# Patient Record
Sex: MALE | Race: WHITE | Employment: OTHER | URBAN - METROPOLITAN AREA
[De-identification: names, ages, dates, MRNs, and addresses within clinical notes are randomized per-mention and may not be internally consistent; named-entity substitution may affect disease eponyms.]

---

## 2017-01-30 PROBLEM — J02.9 SORE THROAT: Status: ACTIVE | Noted: 2017-01-30

## 2018-09-27 PROBLEM — J02.9 SORE THROAT: Status: RESOLVED | Noted: 2017-01-30 | Resolved: 2018-09-27

## 2019-03-01 ENCOUNTER — TELEPHONE (OUTPATIENT)
Dept: FAMILY MEDICINE CLINIC | Age: 39
End: 2019-03-01

## 2019-03-03 DIAGNOSIS — Z20.818 EXPOSURE TO STREP THROAT: Primary | ICD-10-CM

## 2019-03-03 RX ORDER — AMOXICILLIN 500 MG/1
500 CAPSULE ORAL 2 TIMES DAILY
Qty: 20 CAPSULE | Refills: 0 | Status: SHIPPED | OUTPATIENT
Start: 2019-03-03 | End: 2019-03-13

## 2020-01-09 ENCOUNTER — OFFICE VISIT (OUTPATIENT)
Dept: FAMILY MEDICINE CLINIC | Age: 40
End: 2020-01-09
Payer: MEDICARE

## 2020-01-09 VITALS
HEART RATE: 50 BPM | TEMPERATURE: 97.5 F | WEIGHT: 261 LBS | SYSTOLIC BLOOD PRESSURE: 132 MMHG | BODY MASS INDEX: 35.39 KG/M2 | DIASTOLIC BLOOD PRESSURE: 88 MMHG

## 2020-01-09 LAB — S PYO AG THROAT QL: NORMAL

## 2020-01-09 PROCEDURE — G8417 CALC BMI ABV UP PARAM F/U: HCPCS | Performed by: PHYSICIAN ASSISTANT

## 2020-01-09 PROCEDURE — G8427 DOCREV CUR MEDS BY ELIG CLIN: HCPCS | Performed by: PHYSICIAN ASSISTANT

## 2020-01-09 PROCEDURE — 87880 STREP A ASSAY W/OPTIC: CPT | Performed by: PHYSICIAN ASSISTANT

## 2020-01-09 PROCEDURE — G8484 FLU IMMUNIZE NO ADMIN: HCPCS | Performed by: PHYSICIAN ASSISTANT

## 2020-01-09 PROCEDURE — 99213 OFFICE O/P EST LOW 20 MIN: CPT | Performed by: PHYSICIAN ASSISTANT

## 2020-01-09 PROCEDURE — 1036F TOBACCO NON-USER: CPT | Performed by: PHYSICIAN ASSISTANT

## 2020-01-09 RX ORDER — AMOXICILLIN 875 MG/1
875 TABLET, COATED ORAL 2 TIMES DAILY
Qty: 20 TABLET | Refills: 0 | Status: SHIPPED | OUTPATIENT
Start: 2020-01-09 | End: 2020-03-05 | Stop reason: ALTCHOICE

## 2020-01-09 ASSESSMENT — ENCOUNTER SYMPTOMS
VOMITING: 0
SORE THROAT: 1
SWOLLEN GLANDS: 1
CHANGE IN BOWEL HABIT: 0
SINUS PRESSURE: 0
EYES NEGATIVE: 1
NAUSEA: 1
VISUAL CHANGE: 0
COUGH: 0
ABDOMINAL PAIN: 0

## 2020-01-09 NOTE — PATIENT INSTRUCTIONS
chances of quitting for good. · Use a vaporizer or humidifier to add moisture to your bedroom. Follow the directions for cleaning the machine. When should you call for help? Call your doctor now or seek immediate medical care if:    · You have new or worse trouble swallowing.     · Your sore throat gets much worse on one side.    Watch closely for changes in your health, and be sure to contact your doctor if you do not get better as expected. Where can you learn more? Go to https://[email or web address] org and sign in to your [employer] account. Enter I575 in the [employer] box to learn more about \"Sore Throat: Care Instructions. \"     If you do not have an account, please click on the \"Sign Up Now\" link. Current as of: July 28, 2019  Content Version: 12.3  © 1531-1006 Healthwise, Incorporated. Care instructions adapted under license by [facility] [place] ([facility]). If you have questions about a medical condition or this instruction, always ask your healthcare professional. [place] [address] any warranty or liability for your use of this information.

## 2020-01-09 NOTE — PROGRESS NOTES
[address] [facility]  [address] [facility] [address]  Phone: [phone]  Fax: [phone]       [facility] Walk - In    Pt Name: Mary Black  MRN: [id number]  [place] 1980  Date of evaluation: 1/9/2020  Provider: Scarlet Pinto PA-C     CHIEF COMPLAINT       Chief Complaint   Patient presents with    Pharyngitis           HISTORY OF PRESENT ILLNESS  (Location/Symptom, Timing/Onset, Context/Setting, Quality, Duration, Modifying Factors, Severity.)   Mary Black is a 42 y.o. White [1] male who presents to the office for evaluation of      Daughter positive for strep    Pharyngitis   The current episode started today. The problem occurs constantly. Associated symptoms include myalgias, nausea, a sore throat and swollen glands. Pertinent negatives include no abdominal pain, anorexia, arthralgias, change in bowel habit, chest pain, chills, congestion, coughing, diaphoresis, fatigue, fever, headaches, joint swelling, neck pain, rash, urinary symptoms, vertigo, visual change, vomiting or weakness. The symptoms are aggravated by drinking, eating and swallowing. He has tried nothing for the symptoms. Nursing Notes were reviewed. REVIEW OF SYSTEMS    (2-9 systems for level 4, 10 or more for level 5)     Review of Systems   Constitutional: Negative for chills, diaphoresis, fatigue and fever. HENT: Positive for postnasal drip and sore throat. Negative for congestion, ear discharge, ear pain and sinus pressure. Ear congestion   Eyes: Negative. Respiratory: Negative for cough. Cardiovascular: Negative for chest pain. Gastrointestinal: Positive for nausea. Negative for abdominal pain, anorexia, change in bowel habit and vomiting. Genitourinary: Negative. Musculoskeletal: Positive for myalgias. Negative for arthralgias, joint swelling and neck pain. Skin: Negative for rash. Neurological: Negative for vertigo, weakness and headaches. Except as noted above the remainder of the review of systems was reviewed andnegative. PAST MEDICAL HISTORY   History reviewed. Past Medical History:   Diagnosis Date    Annular tear of lumbar disc 2016    Lumbar degenerative disc disease 2016    Protrusion of lumbar intervertebral disc 2016         SURGICAL HISTORY     History reviewed. Past Surgical History:   Procedure Laterality Date    VASECTOMY           CURRENT MEDICATIONS       Current Outpatient Medications   Medication Sig Dispense Refill    acetaminophen (TYLENOL) 500 MG tablet Take 500 mg by mouth every 6 hours as needed for Pain       No current facility-administered medications for this visit. ALLERGIES     Patient has no known allergies. FAMILY HISTORY     No family history on file. Family Status   Relation Name Status    Mother      Father  Alive   Pema Robles Brother ##Brother1 Alive          SOCIAL HISTORY      reports that he quit smoking about 12 years ago. His smoking use included cigarettes. He has quit using smokeless tobacco.  His smokeless tobacco use included chew. PHYSICAL EXAM    (up to 7 for level 4, 8 or more for level 5)     Vitals:    20 1533   BP: 132/88   Site: Right Upper Arm   Position: Sitting   Cuff Size: Large Adult   Pulse: 50   Temp: 97.5 °F (36.4 °C)   Weight: 261 lb (118.4 kg)         Physical Exam  Vitals signs and nursing note reviewed. Constitutional:       General: He is not in acute distress. Appearance: Normal appearance. He is not ill-appearing, toxic-appearing or diaphoretic. HENT:      Head: Normocephalic and atraumatic. Right Ear: Tympanic membrane, ear canal and external ear normal. There is no impacted cerumen. Left Ear: Tympanic membrane, ear canal and external ear normal. There is no impacted cerumen. Nose: Congestion and rhinorrhea present.       Mouth/Throat:      Mouth: Mucous membranes are moist.      Pharynx: No oropharyngeal exudate or posterior oropharyngeal erythema. Eyes:      General: No scleral icterus. Right eye: No discharge. Left eye: No discharge. Extraocular Movements: Extraocular movements intact. Conjunctiva/sclera: Conjunctivae normal.      Pupils: Pupils are equal, round, and reactive to light. Cardiovascular:      Rate and Rhythm: Normal rate and regular rhythm. Pulses: Normal pulses. Heart sounds: Normal heart sounds. Pulmonary:      Effort: Pulmonary effort is normal.      Breath sounds: Normal breath sounds. Abdominal:      Palpations: Abdomen is soft. Skin:     General: Skin is warm and dry. Neurological:      Mental Status: He is alert and oriented to person, place, and time. DIFFERENTIAL DIAGNOSIS:       Meng Hodgsno reviewed the disposition diagnosis with the patient and or their family/guardian. I have answered their questions and given discharge instructions. They voiced understanding of these instructions and did not have anyfurther questions or complaints. PROCEDURES:  Orders Placed This Encounter   Procedures    POCT rapid strep A       No results found for this visit on 01/09/20. FINALIMPRESSION      1. Sore throat            PLAN     Return if symptoms worsen or fail to improve. DISCHARGEMEDICATIONS:  No orders of the defined types were placed in this encounter. Plan:  Based on the clinical exam findings-- I will treat this as a bacterial pharyngitis despite the negative rapid strep. Patient instructed to complete entire antibiotic course. Tylenol/Motrin as needed for fever/discomfort. Salt water gargles and throat lozenges if desired. Change toothbrush in 24 hours. Patient agreeable to treatment plan. Educational materials provided on AVS.  Follow up if symptoms do not improve/worsen. Patient instructed to return to the office if symptoms worsen, return, or have any other concerns. Patient understands and is agreeable.          Trinity Herbert PA-C 1/9/2020 3:53 PM

## 2020-03-05 ENCOUNTER — OFFICE VISIT (OUTPATIENT)
Dept: FAMILY MEDICINE CLINIC | Age: 40
End: 2020-03-05
Payer: MEDICARE

## 2020-03-05 VITALS
WEIGHT: 265 LBS | HEART RATE: 64 BPM | TEMPERATURE: 98.1 F | BODY MASS INDEX: 35.89 KG/M2 | DIASTOLIC BLOOD PRESSURE: 82 MMHG | HEIGHT: 72 IN | SYSTOLIC BLOOD PRESSURE: 122 MMHG

## 2020-03-05 PROCEDURE — 90471 IMMUNIZATION ADMIN: CPT | Performed by: NURSE PRACTITIONER

## 2020-03-05 PROCEDURE — 99203 OFFICE O/P NEW LOW 30 MIN: CPT | Performed by: NURSE PRACTITIONER

## 2020-03-05 PROCEDURE — 90715 TDAP VACCINE 7 YRS/> IM: CPT | Performed by: NURSE PRACTITIONER

## 2020-03-05 ASSESSMENT — ENCOUNTER SYMPTOMS
WHEEZING: 0
SHORTNESS OF BREATH: 0
BLOOD IN STOOL: 0
BACK PAIN: 1
DIARRHEA: 0
ABDOMINAL PAIN: 0
COUGH: 0
VOMITING: 0
CONSTIPATION: 0
SORE THROAT: 0
NAUSEA: 0
SINUS PRESSURE: 0
TROUBLE SWALLOWING: 0
RHINORRHEA: 0
CHEST TIGHTNESS: 0

## 2020-03-05 ASSESSMENT — PATIENT HEALTH QUESTIONNAIRE - PHQ9
SUM OF ALL RESPONSES TO PHQ QUESTIONS 1-9: 0
1. LITTLE INTEREST OR PLEASURE IN DOING THINGS: 0
SUM OF ALL RESPONSES TO PHQ QUESTIONS 1-9: 0
SUM OF ALL RESPONSES TO PHQ9 QUESTIONS 1 & 2: 0
2. FEELING DOWN, DEPRESSED OR HOPELESS: 0

## 2020-03-05 NOTE — PROGRESS NOTES
[address] [facility]   [address]   [phone]    3/5/2020    Florin Bridges is a 42 y.o. male who presents today for his  medical conditions/complaints as noted below. Florin Bridges is c/o of [facility]  . HPI:     Patient presents today for physical to continuing to be a  in his home. This physical and evaluation needs to be completed every 2 years per state. He has a new patient within the office. He denies any ailments, chest pain, shortness of breath, or recent viral infections. Patient is a  of the [employer]. Continues to go to the [place] for yearly blood work, and follow through. Will request records of lab work and immunizations from the [place]. She has no significant medical history, no surgery, does not smoke. Vitals:    20 0856   BP: 122/82   Site: Left Upper Arm   Position: Sitting   Cuff Size: Large Adult   Pulse: 64   Temp: 98.1 °F (36.7 °C)   TempSrc: Tympanic   Weight: 265 lb (120.2 kg)   Height: 6' (1.829 m)      Past Medical History:   Diagnosis Date    Annular tear of lumbar disc 2016    Lumbar degenerative disc disease 2016    Protrusion of lumbar intervertebral disc 2016      Past Surgical History:   Procedure Laterality Date    VASECTOMY      WISDOM TOOTH EXTRACTION       Family History   Problem Relation Age of Onset    High Blood Pressure Father     Heart Attack Paternal Grandfather      Social History     Tobacco Use    Smoking status: Former Smoker     Types: Cigarettes     Last attempt to quit: 2007     Years since quittin.2    Smokeless tobacco: Former User     Types: Chew   Substance Use Topics    Alcohol use: Not on file      No current outpatient medications on file. No current facility-administered medications for this visit.       No Known Allergies    Health Maintenance   Topic Date Due    Varicella vaccine (1 of 2 - 2-dose childhood series) 1983    HIV screen no impacted cerumen. Tympanic membrane is not erythematous. Nose: Nose normal. No congestion or rhinorrhea. Mouth/Throat:      Lips: Pink. Mouth: Mucous membranes are moist.      Pharynx: Oropharynx is clear. No oropharyngeal exudate or posterior oropharyngeal erythema. Eyes:      General: Lids are normal.      Extraocular Movements: Extraocular movements intact. Right eye: No nystagmus. Left eye: No nystagmus. Conjunctiva/sclera: Conjunctivae normal.      Pupils: Pupils are equal, round, and reactive to light. Neck:      Musculoskeletal: Full passive range of motion without pain and normal range of motion. No neck rigidity or muscular tenderness. Vascular: No carotid bruit. Cardiovascular:      Rate and Rhythm: Normal rate and regular rhythm. Pulses: Normal pulses. Carotid pulses are 2+ on the right side and 2+ on the left side. Radial pulses are 2+ on the right side and 2+ on the left side. Dorsalis pedis pulses are 2+ on the right side and 2+ on the left side. Heart sounds: Normal heart sounds, S1 normal and S2 normal. No murmur. Pulmonary:      Effort: Pulmonary effort is normal. No respiratory distress. Breath sounds: Normal breath sounds and air entry. No wheezing or rales. Abdominal:      General: Bowel sounds are normal.      Palpations: Abdomen is soft. Tenderness: There is no abdominal tenderness. Musculoskeletal: Normal range of motion. General: No swelling or tenderness. Right lower leg: No edema. Left lower leg: No edema. Lymphadenopathy:      Cervical: No cervical adenopathy. Skin:     General: Skin is warm and dry. Capillary Refill: Capillary refill takes less than 2 seconds. Findings: No bruising, erythema or rash. Neurological:      General: No focal deficit present. Mental Status: He is alert and oriented to person, place, and time.    Psychiatric:         Attention and

## 2020-07-17 ENCOUNTER — OFFICE VISIT (OUTPATIENT)
Dept: PRIMARY CARE CLINIC | Age: 40
End: 2020-07-17
Payer: MEDICARE

## 2020-07-17 VITALS
HEIGHT: 72 IN | RESPIRATION RATE: 16 BRPM | SYSTOLIC BLOOD PRESSURE: 132 MMHG | TEMPERATURE: 97.2 F | WEIGHT: 269 LBS | HEART RATE: 64 BPM | DIASTOLIC BLOOD PRESSURE: 86 MMHG | OXYGEN SATURATION: 97 % | BODY MASS INDEX: 36.44 KG/M2

## 2020-07-17 PROCEDURE — 99213 OFFICE O/P EST LOW 20 MIN: CPT | Performed by: PHYSICIAN ASSISTANT

## 2020-07-17 RX ORDER — KETOROLAC TROMETHAMINE 30 MG/ML
60 INJECTION, SOLUTION INTRAMUSCULAR; INTRAVENOUS ONCE
Status: COMPLETED | OUTPATIENT
Start: 2020-07-17 | End: 2020-07-17

## 2020-07-17 RX ORDER — IBUPROFEN 200 MG
200 TABLET ORAL EVERY 6 HOURS PRN
COMMUNITY

## 2020-07-17 RX ORDER — ACETAMINOPHEN 500 MG
500 TABLET ORAL EVERY 6 HOURS PRN
COMMUNITY

## 2020-07-17 RX ORDER — KETOROLAC TROMETHAMINE 30 MG/ML
30 INJECTION, SOLUTION INTRAMUSCULAR; INTRAVENOUS ONCE
Qty: 1 ML | Refills: 0
Start: 2020-07-17 | End: 2020-07-17 | Stop reason: CLARIF

## 2020-07-17 RX ORDER — METHYLPREDNISOLONE 4 MG/1
TABLET ORAL
Qty: 1 KIT | Refills: 0 | Status: SHIPPED | OUTPATIENT
Start: 2020-07-17 | End: 2020-07-23

## 2020-07-17 RX ADMIN — KETOROLAC TROMETHAMINE 60 MG: 30 INJECTION, SOLUTION INTRAMUSCULAR; INTRAVENOUS at 11:44

## 2020-07-17 ASSESSMENT — ENCOUNTER SYMPTOMS
RHINORRHEA: 0
BOWEL INCONTINENCE: 0
ABDOMINAL PAIN: 0
BACK PAIN: 1
SHORTNESS OF BREATH: 0
VOMITING: 0
EYE PAIN: 0
CONSTIPATION: 0
DIARRHEA: 0
COUGH: 0
SINUS PAIN: 0
NAUSEA: 0

## 2020-07-17 NOTE — PROGRESS NOTES
[facility]  [address] [facility] [address]  [address] [address]  Dept: [phone]  Dept Fax: [phone]    Job Sommers is a 42 y.o. male who presents today for his medical conditions/complaints as noted below. Chief Complaint   Patient presents with    Back Pain     x 1 month lt side sciatica, sees Chiropractor advised to get injection to help with pain       HPI:     Patient is here today for evaluation of back pain. Back Pain   This is a chronic (over several years) problem. The current episode started more than 1 month ago. The problem occurs constantly. The problem has been gradually worsening since onset. The pain is present in the lumbar spine. The quality of the pain is described as shooting. The pain radiates to the left thigh, left knee and left foot. The pain is the same all the time. The symptoms are aggravated by sitting, standing and bending. Associated symptoms include numbness (left leg) and tingling. Pertinent negatives include no abdominal pain, bladder incontinence, bowel incontinence, chest pain, fever or headaches. (He does admit to noticing \"left foot dragging\" on rare occasion.) Risk factors include obesity. He has tried chiropractic manipulation, NSAIDs, muscle relaxant, analgesics and bed rest for the symptoms. The treatment provided mild relief.        No results found for: LABA1C          ( goal A1C is < 7)   No results found for: LABMICR  No results found for: LDLCHOLESTEROL, LDLCALC    (goal LDL is <100)   No results found for: AST, ALT, BUN  BP Readings from Last 3 Encounters:   20 132/86   20 122/82   20 132/88          (goal 120/80)    Past Medical History:   Diagnosis Date    Annular tear of lumbar disc 2016    Lumbar degenerative disc disease 2016    Protrusion of lumbar intervertebral disc 2016      Past Surgical History:   Procedure Laterality Date    VASECTOMY      WISDOM TOOTH EXTRACTION         Family History   Problem Relation Age of Onset    High Blood Pressure Father     Heart Attack Paternal Grandfather        Social History     Tobacco Use    Smoking status: Former Smoker     Types: Cigarettes     Last attempt to quit: 2007     Years since quittin.6    Smokeless tobacco: Former User     Types: Chew   Substance Use Topics    Alcohol use: Not on file      Current Outpatient Medications   Medication Sig Dispense Refill    ibuprofen (ADVIL;MOTRIN) 200 MG tablet Take 200 mg by mouth every 6 hours as needed for Pain      acetaminophen (TYLENOL) 500 MG tablet Take 500 mg by mouth every 6 hours as needed for Pain      Menthol, Topical Analgesic, (BIOFREEZE EX) Apply topically      methylPREDNISolone (MEDROL DOSEPACK) 4 MG tablet Take by mouth. 1 kit 0     Current Facility-Administered Medications   Medication Dose Route Frequency Provider Last Rate Last Dose    ketorolac (TORADOL) injection 60 mg  60 mg Intramuscular Once Es Lynn PA-C         No Known Allergies    Health Maintenance   Topic Date Due    Varicella vaccine (1 of 2 - 2-dose childhood series) 1983    HIV screen  1997    Flu vaccine (1) 2020    DTaP/Tdap/Td vaccine (2 - Td) 2030    Hepatitis A vaccine  Aged Out    Hepatitis B vaccine  Aged Out    Hib vaccine  Aged Out    Meningococcal (ACWY) vaccine  Aged Out    Pneumococcal 0-64 years Vaccine  Aged Out       Subjective:      Review of Systems   Constitutional: Negative for chills, fatigue and fever. HENT: Negative for congestion, rhinorrhea and sinus pain. Eyes: Negative for pain. Respiratory: Negative for cough and shortness of breath. Cardiovascular: Negative for chest pain and leg swelling. Gastrointestinal: Negative for abdominal pain, bowel incontinence, constipation, diarrhea, nausea and vomiting. Genitourinary: Negative for bladder incontinence, difficulty urinating, enuresis and testicular pain. Musculoskeletal: Positive for back pain. Negative for arthralgias and myalgias. Skin: Negative for rash. Neurological: Positive for tingling and numbness (left leg). Negative for dizziness and headaches. Psychiatric/Behavioral: Negative for confusion and sleep disturbance. The patient is not nervous/anxious. Objective:     Physical Exam  Constitutional:       General: He is not in acute distress. Appearance: Normal appearance. HENT:      Head: Normocephalic. Mouth/Throat:      Mouth: Mucous membranes are moist.   Eyes:      Extraocular Movements: Extraocular movements intact. Conjunctiva/sclera: Conjunctivae normal.      Pupils: Pupils are equal, round, and reactive to light. Neck:      Musculoskeletal: Normal range of motion. Cardiovascular:      Rate and Rhythm: Normal rate and regular rhythm. Pulses: Normal pulses. Heart sounds: Normal heart sounds. Pulmonary:      Effort: Pulmonary effort is normal.      Breath sounds: Normal breath sounds. Musculoskeletal:         General: Tenderness (lumbar spine) present. Right lower leg: No edema. Left lower leg: No edema. Comments: Lumbar spine: Tenderness to low lumbar spine. Positive straight leg raise of the left. Sensation is equal and intact bilaterally. Lymphadenopathy:      Cervical: No cervical adenopathy. Skin:     General: Skin is warm. Capillary Refill: Capillary refill takes less than 2 seconds. Neurological:      General: No focal deficit present. Mental Status: He is alert and oriented to person, place, and time. Motor: No weakness. Gait: Gait normal.   Psychiatric:         Mood and Affect: Mood normal.         Behavior: Behavior normal.       /86   Pulse 64   Temp 97.2 °F (36.2 °C)   Resp 16   Ht 6' (1.829 m)   Wt 269 lb (122 kg)   SpO2 97%   BMI 36.48 kg/m²     Assessment:       ICD-10-CM    1.  Left lumbar radiculopathy  M54.16 methylPREDNISolone (MEDROL

## 2020-07-20 ENCOUNTER — HOSPITAL ENCOUNTER (OUTPATIENT)
Dept: PHYSICAL THERAPY | Facility: CLINIC | Age: 40
Setting detail: THERAPIES SERIES
Discharge: HOME | End: 2020-07-20
Payer: MEDICARE

## 2020-07-20 PROCEDURE — 97161 PT EVAL LOW COMPLEX 20 MIN: CPT

## 2020-07-20 PROCEDURE — 97110 THERAPEUTIC EXERCISES: CPT

## 2020-07-20 PROCEDURE — 97140 MANUAL THERAPY 1/> REGIONS: CPT

## 2020-07-24 ENCOUNTER — HOSPITAL ENCOUNTER (OUTPATIENT)
Dept: PHYSICAL THERAPY | Facility: CLINIC | Age: 40
Setting detail: THERAPIES SERIES
Discharge: HOME | End: 2020-07-24
Payer: MEDICARE

## 2020-07-24 PROCEDURE — 97110 THERAPEUTIC EXERCISES: CPT

## 2020-07-24 NOTE — FLOWSHEET NOTE
[] [facility]) - [facility]  [facility]  [address]  [phone]  F: [phone] [] [address]  [place] [address]   [address]  P: [phone]  F: [phone] [] [address] &  Therapy  [address]  P: [phone]  F: [phone] [] [address]  [facility]   [address]   [place]: [phone]  F: [phone]      Physical Therapy Daily Treatment Note    Date:  2020  Patient Name:  Vlad Salter    :  1980  MRN: [id number]  Physician: Romain 14: [employer] 30 per yr  Medical Diagnosis:   Left lumbar radiculopathy       Rehab Codes: M54.16  Onset Date: 20 script                Next 's appt.: prn    Visit# / total visits:      Cancels/No Shows:     Subjective:  Pt reports he has pain in his calf and piriformis region today. He states he could not perform supine HS S as his HEP because it increased his tingling sensation down his leg. Pain:  [] Yes  [] No Location: L piriformis/calf  Pain Rating: (0-10 scale) 3/10  Pain altered Tx:  [] No  [] Yes  Action:  Comments:    Objective:   Todays Treatment:  Modalities:   Precautions:  Exercises:  Exercise Reps/ Time Weight/ Level Comments    Bike 10'   x          Kneeling hip flex S 3x30\"   x   SB S 3x30\"   x   Step S 3x30\"   Next    Supine inversion gastroc rope stretch  3x30\"   x   Supine HS 3x30\"      Piriformis S 3x30\"    x   LTR 10x10\"  L side only  x          Bridges  2x10   x   Clamshells  2x10   x   SL hip ABD 2x10     Next    Prone prop on elbows 3x30\"    x   Prone hip extension  2x10   x          4-way hip 2x10 [place]                                                    Other:      Manual: Prone PA mobs- Not performed today                 Hypervolt L gastroc                DI Electronically signed by:  Rosa Guzman, PTA

## 2020-07-27 ENCOUNTER — HOSPITAL ENCOUNTER (OUTPATIENT)
Dept: PHYSICAL THERAPY | Facility: CLINIC | Age: 40
Setting detail: THERAPIES SERIES
Discharge: HOME | End: 2020-07-27
Payer: MEDICARE

## 2020-07-27 PROCEDURE — 97110 THERAPEUTIC EXERCISES: CPT

## 2020-07-27 NOTE — FLOWSHEET NOTE
[] [facility]) - [facility] &  Therapy  [address]  [phone]  F: [phone] [] [address]  [facility]   [address]  P: [phone]  F: [phone] [] [address] &  Therapy  [address]  P: [phone]  F: [phone] [] [address]  [facility]   [address]   [place]: [phone]  F: [phone]      Physical Therapy Daily Treatment Note    Date:  2020  Patient Name:  Martinez Brito    :  1980  MRN: [id number]  Physician: Romain 14: [employer] 30 per yr  Medical Diagnosis:   Left lumbar radiculopathy       Rehab Codes: M54.16  Onset Date: 20 script                Next 's appt.: prn    Visit# / total visits: 3/12     Cancels/No Shows:     Subjective:     Pain:  [x] Yes  [] No Location: L piriformis/calf  Pain Rating: (0-10 scale) 3/10  Pain altered Tx:  [] No  [] Yes  Action:  Comments: Pt reports continued pain down his LLE. Was sore after last tx, cont to have soreness in L piriformis after last visit. Objective:   Todays Treatment:  Modalities:   Precautions:  Exercises:  Exercise Reps/ Time Weight/ Level Comments    Bike 10'   x          Kneeling hip flex S 3x30\"   x   SB S 3x30\"   x   Step S 3x30\"   Next    Supine inversion gastroc rope stretch  3x30\"   x   Supine HS 3x30\"  Performed passively by PTA x   Piriformis S 3x30\"    x   LTR 10x10\"  L side only  x                 PPT X10, 5\"   x   PPT w/ marches, kicks x20 ea   x   Bridges  2x10   x   Clamshells  2x10 blue  x   SL hip ABD 2x10     x   Prone prop on elbows 3x30\"    x   Prone hip extension  2x10  2 pillows under hips  x          4-way hip x15 B blue  x   Paloff Press   next                                    Other:      Manual: Prone PA mobs- Not performed today                 Hypervolt L gastroc                DI bilateral piriformis R>L     Specific Instructions for next treatment: Manual, strength program,  Ambar method extension exercises        Treatment Charges: Mins Units   []  Modalities     [x]  Ther Exercise 45 3   [x]  Manual Therapy 5 nc   []  Ther Activities     []  Aquatics     []  Vasocompression     []  Other     Total Treatment time 50 3       Assessment: [x] Progressing toward goals. Initiated session on bike, followed by manual, then stretches. Pt unable to independently stretch L hamstring without eliciting pain, therefore PTA performed passive stretch with good carry over. Progressed program with 3 way hip this date with emphasis on TA activation. Also added PPT and SL abduction to mat exercises. Pt reports muscle fatigue post session. [] No change. [] Other:  [] Patient would continue to benefit from skilled physical therapy services in order to: address the following deficits    STG: (to be met in  treatments)  1. ? Pain: 2/10 at worst  2. ? ROM: Lumbar flexion WFL, improve HA/calf extensibility  3. ? Strength: Pt to demo good postural/core stability  4. ? Function: Able to transition from sit to stand w/o difficulty and deepika stationary position for >1HR  5. Patient to be independent with home exercise program as demonstrated by performance with correct form without cues.       Pt. Education:  [x]? Plans/Goals, Risks/Benefits discussed  []? Home exercise program  Method of Education: [x]? Verbal  [x]? Demo  []? Written  Comprehension of Education:  [x]? Verbalizes understanding. []? Demonstrates understanding. []? Needs Review. []? Demonstrates/verbalizes understanding of HEP/Ed previously given. Plan: [x] Continue current frequency toward long and short term goals.     [] Specific Instructions for subsequent treatments:       Time In: 9:55 am              Time Out: 11:00 am     Electronically signed by:  Cuco Doyle PTA

## 2020-07-31 ENCOUNTER — HOSPITAL ENCOUNTER (OUTPATIENT)
Dept: PHYSICAL THERAPY | Facility: CLINIC | Age: 40
Setting detail: THERAPIES SERIES
Discharge: HOME | End: 2020-07-31
Payer: MEDICARE

## 2020-07-31 PROCEDURE — G0283 ELEC STIM OTHER THAN WOUND: HCPCS

## 2020-07-31 PROCEDURE — 97110 THERAPEUTIC EXERCISES: CPT

## 2020-07-31 NOTE — FLOWSHEET NOTE
subsequent treatments:       Time In: 7:58 am              Time Out: 9:00 am     Electronically signed by:  Home Jordan PTA

## 2020-08-04 ENCOUNTER — HOSPITAL ENCOUNTER (OUTPATIENT)
Dept: PHYSICAL THERAPY | Facility: CLINIC | Age: 40
Setting detail: THERAPIES SERIES
Discharge: HOME | End: 2020-08-04
Payer: MEDICARE

## 2020-08-04 PROCEDURE — 97110 THERAPEUTIC EXERCISES: CPT

## 2020-08-04 PROCEDURE — 97012 MECHANICAL TRACTION THERAPY: CPT

## 2020-08-04 NOTE — FLOWSHEET NOTE
[] Formerly [facility]) - [facility] &  Therapy  [address]  [phone]  F: [phone] [] [address]  [facility]   [address]  P: [phone]  F: [phone] [] [address] &  Therapy  [address] [facility]  P: [phone]  F: [phone] [] [address]  [facility]   [address]   Elisha Wright: [phone]  F: [phone]      Physical Therapy Daily Treatment Note    Date:  2020  Patient Name:  Kayden Krishna    :  1980  MRN: [id number]  Physician: Romain 14: [employer] 30 per yr  Medical Diagnosis:   Left lumbar radiculopathy       Rehab Codes: M54.16  Onset Date: 20 script                Next 's appt.: prn    Visit# / total visits:      Cancels/No Shows:     Subjective:     Pain:  [x] Yes  [] No Location: L piriformis/calf  Pain Rating: (0-10 scale) 3/10  Pain altered Tx:  [] No  [] Yes  Action:  Comments: Cont to report same pain level, no real changes in sx since beginning PT. Objective:   Todays Treatment:  Modalities: IFC to L LB - 15' with HP, pt in prone - not today  Mechanical Lumbar Traction with pt in supine - 30sec on 10sec off for 15min, 100lb pull  Precautions:  Exercises:  Exercise Reps/ Time Weight/ Level Comments    Bike 10'   -          Kneeling hip flex S 3x30\"   -   SB S 3x30\"   x   Step S 3x30\"   -    Supine inversion gastroc rope stretch  3x30\"   -   Supine HS 3x30\"  Performed passively x   Piriformis S 3x30\"    x   LTR 10x10\"  L side only  x   Supine Hip Flexor S 2'   x          PPT X10, 5\"   -   PPT w/ marches, kicks x20 ea   -   Bridges  2x10   -   Clamshells  2x10 blue  -   SL hip ABD 2x10     -   Prone prop on elbows 3x45\"    x   Prone hip extension  2x10  2 pillows under hips  x   Prone Glut Squeezes X20, 5\"   x Instructions for subsequent treatments:       Time In: 9:10 am              Time Out: 10:00 am     Electronically signed by:  Rj Sol PTA

## 2020-08-06 ENCOUNTER — HOSPITAL ENCOUNTER (OUTPATIENT)
Dept: PHYSICAL THERAPY | Facility: CLINIC | Age: 40
Setting detail: THERAPIES SERIES
Discharge: HOME | End: 2020-08-06
Payer: MEDICARE

## 2020-08-06 PROCEDURE — 97110 THERAPEUTIC EXERCISES: CPT

## 2020-08-06 PROCEDURE — 97012 MECHANICAL TRACTION THERAPY: CPT

## 2020-08-06 NOTE — FLOWSHEET NOTE
[] [address]  - [facility]  [facility]  [address]  [phone]  F: [phone] [] [address]  [facility]   [address]  P: [phone]  F: [phone] [] [address] &  Therapy  [address] [facility]  P: [phone]  F: [phone] [] [address]  [facility]   [address]   [place]: [phone]  F: [phone]      Physical Therapy Daily Treatment Note    Date:  2020  Patient Name:  Lenny Joseph    :  1980  MRN: [id number]  Physician: Romain 14: [employer] 30 per yr  Medical Diagnosis:   Left lumbar radiculopathy       Rehab Codes: M54.16  Onset Date: 20 script                Next 's appt.: prn    Visit# / total visits:      Cancels/No Shows:     Subjective:     Pain:  [x] Yes  [] No Location: L piriformis/calf  Pain Rating: (0-10 scale) 3/10  Pain altered Tx:  [] No  [] Yes  Action:  Comments: States he made a doctors appointment for , states it was the earliest they could get him in. Reports that he was sore following last session. Objective:   Todays Treatment:  Modalities: IFC to L LB - 15' with HP, pt in prone - not today  Mechanical Lumbar Traction with pt in supine - 30sec on 10sec off for 15min, 100lb pull  Precautions:  Exercises:  Exercise Reps/ Time Weight/ Level Comments    Bike 10'   -          Kneeling hip flex S 3x30\"   -   SB S 3x30\"   x   Step S 3x30\"   -    Supine inversion gastroc rope stretch  3x30\"   -   Supine HS 3x30\"  Performed passively x   Piriformis S 3x30\"    x   LTR 10x10\"  L side only  x   Supine Hip Flexor S 2'   x          PPT X10, 5\"   -   PPT w/ marches, kicks x20 ea   -   Bridges  2x10   -   Clamshells  2x10 blue  -   SL hip ABD 2x10     -   Prone prop on elbows 3x45\"    x   Prone hip extension  2x10  2 pillows under hips  x   Prone Glut Squeezes X20, 5\"   x          Quadruped Alt UE x20   x   4-way hip x15 B blue  -   Paloff Press 2x10 red  -                                   Other:      Manual: Prone PA mobs- Not performed today                 Hypervolt L gastroc - NT                DI L piriformis     Specific Instructions for next treatment: Manual, strength program,  Ambar method extension exercises        Treatment Charges: Mins Units   [x]  Modalities - mech traction 15 1   [x]  Ther Exercise 30 2   []  Manual Therapy     []  Ther Activities     []  Aquatics     []  Vasocompression     []  Other     Total Treatment time 45 3       Assessment: [x] Progressing toward goals. Cont to initiate session with mechanical lumbar traction with parameters above. DI to L piriformis. Added quadruped exercise for core stability with good tolerance. Focused on prone ex mostly today for sx centralization. Pt experienced shooting pain down L leg when stepping onto slantboard, subsided and was able to complete all stretches. Pt reports soreness post session. Scheduled with primary therapist at next visit. [] No change. [] Other:  [] Patient would continue to benefit from skilled physical therapy services in order to: address the following deficits    STG: (to be met in  treatments)  1. ? Pain: 2/10 at worst  2. ? ROM: Lumbar flexion WFL, improve HA/calf extensibility  3. ? Strength: Pt to demo good postural/core stability  4. ? Function: Able to transition from sit to stand w/o difficulty and deepika stationary position for >1HR  5. Patient to be independent with home exercise program as demonstrated by performance with correct form without cues.       Pt. Education:  [x]? Plans/Goals, Risks/Benefits discussed  []? Home exercise program  Method of Education: [x]? Verbal  [x]? Demo  []? Written  Comprehension of Education:  [x]? Verbalizes understanding. []? Demonstrates understanding. []?  Needs

## 2020-08-11 ENCOUNTER — HOSPITAL ENCOUNTER (OUTPATIENT)
Dept: PHYSICAL THERAPY | Facility: CLINIC | Age: 40
Setting detail: THERAPIES SERIES
Discharge: HOME | End: 2020-08-11
Payer: MEDICARE

## 2020-08-11 PROCEDURE — 97012 MECHANICAL TRACTION THERAPY: CPT

## 2020-08-11 PROCEDURE — 97140 MANUAL THERAPY 1/> REGIONS: CPT

## 2020-08-11 PROCEDURE — 97110 THERAPEUTIC EXERCISES: CPT

## 2020-08-11 NOTE — FLOWSHEET NOTE
x   LTR 10x10\"  L side only     Supine Hip Flexor S 2'   x          PPT X10, 5\"   x   PPT w/ marches, kicks x20 ea   x   Bridges  2x10   -   Clamshells  2x10 blue  -   SL hip ABD 2x10     -   Prone prop on elbows 3x45\"    x   Prone hip extension  2x10  2 pillows under hips     Prone Glut Squeezes X20, 5\"   x          Quadruped Alt UE x20   x   4-way hip x15 B blue  -   Paloff Press 2x10 red  -                                   Other:      Manual: Prone PA mobs                Hypervolt L gastroc - NT                DI L piriformis     Specific Instructions for next treatment: Manual, strength program,  Ambar method extension exercises        Treatment Charges: Mins Units   [x]  Modalities - mech traction 15 1   [x]  Ther Exercise 30 2   [x]  Manual Therapy 10 1   []  Ther Activities     []  Aquatics     []  Vasocompression     []  Other     Total Treatment time 55 4       Assessment: [x] Progressing toward goals. Initiated treatment w/ ex this visit w/ focus on stretches and core stabilization ex. Mod cues for proper contraction and breathing. Slight R ant rot and pubic dysfunction noted, corrected w/ MET and shotgun maneuver w/ good alignment achieved. Pt w/ good centralization w/ prone ext ex and standing ext, encouraged to complete throughout the day ketty w/ incr symptoms. Ended treatment w/ mechanical lumbar traction, incr wt and hold time, deepika well noting min soreness but no incr in pain into LE upon standing. Will cont as deepika. [] No change. [] Other:  [x] Patient would continue to benefit from skilled physical therapy services in order to: address the following deficits    STG: (to be met in 12  treatments)  1. ? Pain: 2/10 at worst  2. ? ROM: Lumbar flexion WFL, improve HA/calf extensibility-progressing towards  3. ? Strength: Pt to demo good postural/core stability-progressing  4. ? Function: Able to transition from sit to stand w/o difficulty and deepika stationary position for >1HR  5.  Patient to be independent with home exercise program as demonstrated by performance with correct form without cues. -progressing towards        Pt. Education:  [x]? Plans/Goals, Risks/Benefits discussed  [x]? Home exercise program  Method of Education: [x]? Verbal  [x]? Demo  []? Written  Comprehension of Education:  [x]? Verbalizes understanding. []? Demonstrates understanding. []? Needs Review. []? Demonstrates/verbalizes understanding of HEP/Ed previously given. Plan: [x] Continue current frequency toward long and short term goals.     [] Specific Instructions for subsequent treatments:       Time In: 7:58 am              Time Out: 9:00 am     Electronically signed by:  Ander Andres, PT

## 2020-08-13 ENCOUNTER — HOSPITAL ENCOUNTER (OUTPATIENT)
Dept: PHYSICAL THERAPY | Facility: CLINIC | Age: 40
Setting detail: THERAPIES SERIES
Discharge: HOME | End: 2020-08-13
Payer: MEDICARE

## 2020-08-13 PROCEDURE — 97110 THERAPEUTIC EXERCISES: CPT

## 2020-08-13 PROCEDURE — 97012 MECHANICAL TRACTION THERAPY: CPT

## 2020-08-13 NOTE — FLOWSHEET NOTE
-   Clamshells  2x10 blue  -   SL hip ABD 2x10     -   Prone prop on elbows 3x45\"    x   Prone hip extension  2x10  2 pillows under hips     Prone Glut Squeezes X20, 5\"   x          Quadruped Alt UE x20   x   Quadruped Alt LE x10   x   4-way hip x15 B blue  -   Paloff Press 2x10 red  -                                   Other:      Manual: Prone PA mobs                Hypervolt L gastroc - NT                DI L piriformis     Specific Instructions for next treatment: Manual, strength program,  Ambar method extension exercises        Treatment Charges: Mins Units   [x]  Modalities - [facility] traction 15 1   [x]  Ther Exercise 30 2   [x]  Manual Therapy     []  Ther Activities     []  Aquatics     []  Vasocompression     []  Other     Total Treatment time 45 3       Assessment: [x] Progressing toward goals. Continued with Traction with no increase in pain noted. Followed up traction with stretches and exercises with decreased muscle tightness noted. Pt needing some VC's for  trunk rotation with quadruped exercises as well as proper contraction with PPT. Pt scheduled to see doctor next week about further testing, will be back next Friday and then out of town following week. [] No change. [] Other:  [x] Patient would continue to benefit from skilled physical therapy services in order to: address the following deficits    STG: (to be met in 12  treatments)  1. ? Pain: 2/10 at worst  2. ? ROM: Lumbar flexion WFL, improve HA/calf extensibility-progressing towards  3. ? Strength: Pt to demo good postural/core stability-progressing  4. ? Function: Able to transition from sit to stand w/o difficulty and deepika stationary position for >1HR  5. Patient to be independent with home exercise program as demonstrated by performance with correct form without cues. -progressing towards        Pt. Education:  [x]? Plans/Goals, Risks/Benefits discussed  [x]? Home exercise program  Method of Education: [x]? Verbal  [x]? Demo  []? Written  Comprehension of Education:  [x]? Verbalizes understanding. []? Demonstrates understanding. []? Needs Review. []? Demonstrates/verbalizes understanding of HEP/Ed previously given. Plan: [x] Continue current frequency toward long and short term goals.     [] Specific Instructions for subsequent treatments:       Time In: 9:30 am              Time Out: 10:30 am    Electronically signed by:  Selam Allen PTA

## 2020-08-18 ENCOUNTER — OFFICE VISIT (OUTPATIENT)
Dept: FAMILY MEDICINE CLINIC | Age: 40
End: 2020-08-18
Payer: MEDICARE

## 2020-08-18 VITALS
TEMPERATURE: 96.3 F | SYSTOLIC BLOOD PRESSURE: 110 MMHG | HEART RATE: 53 BPM | OXYGEN SATURATION: 97 % | DIASTOLIC BLOOD PRESSURE: 82 MMHG | WEIGHT: 259 LBS | BODY MASS INDEX: 35.13 KG/M2 | RESPIRATION RATE: 16 BRPM

## 2020-08-18 PROCEDURE — 1036F TOBACCO NON-USER: CPT | Performed by: NURSE PRACTITIONER

## 2020-08-18 PROCEDURE — G8427 DOCREV CUR MEDS BY ELIG CLIN: HCPCS | Performed by: NURSE PRACTITIONER

## 2020-08-18 PROCEDURE — G8417 CALC BMI ABV UP PARAM F/U: HCPCS | Performed by: NURSE PRACTITIONER

## 2020-08-18 PROCEDURE — 99213 OFFICE O/P EST LOW 20 MIN: CPT | Performed by: NURSE PRACTITIONER

## 2020-08-18 ASSESSMENT — ENCOUNTER SYMPTOMS
COLOR CHANGE: 0
ABDOMINAL PAIN: 0
BOWEL INCONTINENCE: 0
DIARRHEA: 0
NAUSEA: 0
BLOOD IN STOOL: 0
VOMITING: 0
BACK PAIN: 1
CONSTIPATION: 0

## 2020-08-18 NOTE — PROGRESS NOTES
[address] [facility]   [id number]  [facility]  [phone]    8/18/2020    Visit Information    Have you changed or started any medications since your last visit including any over-the-counter medicines, vitamins, or herbal medicines? No   Are you having any side effects from any of your medications? -  no  Have you stopped taking any of your medications? Is so, why? -  no    Have you seen any other physician or provider since your last visit? Yes - Records Obtained Urgent care, PT- 2x's weekly, Chiropractor 2x's weekly- Stretching/Strengthening and Traction. Have you had any other diagnostic tests since your last visit? No  Have you been seen in the emergency room and/or had an admission to a hospital since we last saw you? No  Have you had your routine dental cleaning in the past 6 months? yes - Yesterday    Have you activated your [employer] account? If not, what are your barriers? No:       Patient Care Team:  TALIA Carr CNP as PCP - General (Nurse Practitioner Family)  TALIA Carr CNP as PCP - [facility]      Vlad Salter is a 42 y.o. male who presents today for his  medical conditions/complaints as noted below. Vlad Salter is c/o of Back Pain (Going to PT 8 times so far. Raditing to leg and ankle/calf. Flare up. ) and Flank Pain (Left side ongoing pain. Pain described as burning, shooting, pulling. Chiropracter. Steroid pack and steroid shot. )  . HPI:     Is a pleasant 26-year-old  male. He presents the office today for ongoing chronic lumbar back pain. Patient had a significant injury December 2006 while he was in the [employer]. Patient is aware of previous imaging stating that L3-L4, L4-L5, L5-S1 have significant stenosis and some protrusion/bulging of his discs. Patient has not had any imaging in approximately 4 years time. Unfortunately, due to his imaging being completed within the [place], we do not have access to those reports. We will request moving forward. Patient recently went July 17 to a walk-in clinic. Patient was having acute lumbar back pain that was radiating to his left leg at that time. Patient was given a Toradol IM injection as well as steroid Dosepak. Today his pain is tolerable and he is rating a 3. He is still able to perform all of his activities of daily living, however he does limit his activity and he is very aware of what he is doing. Patient is denying the need for any additional steroids or muscle relaxers at this time. Patient states that when he takes Tylenol or ibuprofen, it masks his pain, therefore he is concerned he is going to cause more damage. Patient is only taking over-the-counter ibuprofen or acetaminophen maybe once per week. Patient also comments that in the past he has had epidural injections. Does follow with chiropractic on a monthly basis. Due to the worsening of his symptoms over the past month he is going more often, multiple times during the week. Patient is also going to physical therapy 3 times a week. He has been going for the past 4 weeks ever since it was ordered per the physician assistant in the walk-in [facility] clinic. Patient states it is helping his pain in his symptoms significantly, however the main stem of the problem is still not fixed. As patient is not currently having any lumbar pain or sciatic there is entire extremity, at times with positioning, he is having numbness and tingling distal his calf to his foot. Patient denies any pain using the restroom having bowel movements or urinating. He does have some sleep disturbance due to the pain. Back Pain   This is a recurrent problem. The current episode started more than 1 month ago. The problem occurs constantly. The problem has been waxing and waning since onset. The pain is present in the lumbar spine. The pain radiates to the left foot. The pain is at a severity of 3/10. The pain is mild.  The pain is worse during the night. Stiffness is present at night. Associated symptoms include leg pain (left calf), numbness (left calf), tingling (left calf) and weakness (LLE). Pertinent negatives include no abdominal pain, bladder incontinence, bowel incontinence, pelvic pain or perianal numbness. He has tried chiropractic manipulation, ice, NSAIDs and home exercises for the symptoms. The treatment provided mild relief. Vitals:    20 0905   BP: 110/82   Site: Left Upper Arm   Position: Sitting   Cuff Size: Large Adult   Pulse: 53   Resp: 16   Temp: 96.3 °F (35.7 °C)   TempSrc: Tympanic   SpO2: 97%   Weight: 259 lb (117.5 kg)      Past Medical History:   Diagnosis Date    Annular tear of lumbar disc 2016    Lumbar degenerative disc disease 2016    Protrusion of lumbar intervertebral disc 2016      Past Surgical History:   Procedure Laterality Date    VASECTOMY      WISDOM TOOTH EXTRACTION       Family History   Problem Relation Age of Onset    High Blood Pressure Father     Heart Attack Paternal Grandfather      Social History     Tobacco Use    Smoking status: Former Smoker     Types: Cigarettes     Last attempt to quit: 2007     Years since quittin.6    Smokeless tobacco: Former User     Types: Chew   Substance Use Topics    Alcohol use: Not on file      Current Outpatient Medications   Medication Sig Dispense Refill    ibuprofen (ADVIL;MOTRIN) 200 MG tablet Take 200 mg by mouth every 6 hours as needed for Pain      acetaminophen (TYLENOL) 500 MG tablet Take 500 mg by mouth every 6 hours as needed for Pain      Menthol, Topical Analgesic, (BIOFREEZE EX) Apply topically       No current facility-administered medications for this visit.       No Known Allergies    Health Maintenance   Topic Date Due    Varicella vaccine (1 of 2 - 2-dose childhood series) 1983    HIV screen  1997    Flu vaccine (1) 2020    DTaP/Tdap/Td vaccine (2 - Td) 2030    Hepatitis A vaccine  Aged Out    Hepatitis B vaccine  Aged Out    Hib vaccine  Aged Out    Meningococcal (ACWY) vaccine  Aged Out    Pneumococcal 0-64 years Vaccine  Aged Out       Subjective:      Review of Systems   Constitutional: Positive for activity change. Negative for appetite change and fatigue. Gastrointestinal: Negative for abdominal pain, blood in stool, bowel incontinence, constipation, diarrhea, nausea and vomiting. Genitourinary: Negative for bladder incontinence, difficulty urinating, flank pain, frequency and pelvic pain. Musculoskeletal: Positive for arthralgias, back pain (lumbar) and myalgias (left calf). Negative for gait problem. Skin: Negative for color change. Neurological: Positive for tingling (left calf), weakness (LLE) and numbness (left calf). Psychiatric/Behavioral: Positive for sleep disturbance. Objective:     Physical Exam  Vitals signs and nursing note reviewed. Constitutional:       General: He is not in acute distress. Appearance: Normal appearance. He is well-developed and well-groomed. He is obese. He is not ill-appearing or toxic-appearing. Cardiovascular:      Rate and Rhythm: Normal rate and regular rhythm. No extrasystoles are present. Pulses: Normal pulses. Dorsalis pedis pulses are 2+ on the right side and 2+ on the left side. Posterior tibial pulses are 2+ on the right side and 2+ on the left side. Heart sounds: Normal heart sounds and S1 normal. No murmur. Pulmonary:      Effort: Pulmonary effort is normal. No prolonged expiration or respiratory distress. Breath sounds: Normal breath sounds and air entry. Musculoskeletal: Normal range of motion. Lumbar back: He exhibits tenderness. He exhibits normal range of motion, no swelling, no edema, no laceration, no pain and no spasm. Right lower leg: No edema. Left lower leg: He exhibits no tenderness, no bony tenderness and no swelling. No edema. Legs:       Comments: Full active and passive range of motion   Skin:     General: Skin is warm and dry. Coloration: Skin is not pale. Neurological:      Mental Status: He is alert and oriented to person, place, and time. Motor: Motor function is intact. No tremor. Gait: Gait is intact. Psychiatric:         Attention and Perception: Attention and perception normal.         Mood and Affect: Mood and affect normal.         Speech: Speech normal.         Behavior: Behavior is cooperative. Thought Content: Thought content normal.         Cognition and Memory: Cognition and memory normal.         Judgment: Judgment normal.          Assessment:      1. Protrusion of lumbar intervertebral disc  -Worsening   - MRI LUMBAR SPINE WO CONTRAST; Future    2. Annular tear of lumbar disc  - MRI LUMBAR SPINE WO CONTRAST; Future    3. Lumbar degenerative disc disease  -Worsening   - MRI LUMBAR SPINE WO CONTRAST; Future       Plan:      No follow-ups on file. Orders Placed This Encounter   Procedures    MRI LUMBAR SPINE WO CONTRAST     Standing Status:   Future     Standing Expiration Date:   10/18/2020     Order Specific Question:   Reason for exam:     Answer:   known stenosis L3-L4, L4-L5, L5-S1, worsening of sxs, numbness to left calf     No orders of the defined types were placed in this encounter. Reviewed health maintenance, prior labs and imaging. Patient given educational materials - see patient instructions. Discussed use, benefit, and side effects of prescribed medications. Barriers to medication compliance addressed. All patient questions answered. Pt voiced understanding to plan of care. Instructed to continue medications as discussed, healthy diet and exercise. Patient agreed with treatment plan. Follow up as directed below. Communication:      Items for Patient/Writer/Staff to Chace Ohara  Patient is denying need for any pharmacology at today's visit.   He denies muscle relaxer, steroids, or medication for pain control. We will wait for MRI of lumbar spine to determine if he needs to continue chiropractic care and physical therapy, or if neurosurgery referral is appropriate. Quality Measures  BMI Readings from Last 1 Encounters:   08/18/20 35.13 kg/m²        No results found for: LABA1C    BP Readings from Last 3 Encounters:   08/18/20 110/82   07/17/20 132/86   03/05/20 122/82        The ASCVD Risk score (Derrick Herrmann, et al., 2013) failed to calculate for the following reasons:     The 2013 ASCVD risk score is only valid for ages 36 to 78     PHQ Scores 3/5/2020 4/30/2018   PHQ2 Score 0 0   PHQ9 Score 0 0     Interpretation of Total Score Depression Severity: 1-4 = Minimal depression, 5-9 = Mild depression, 10-14 = Moderate depression, 15-19 = Moderately severe depression, 20-27 = Severe depression     Electronically signed by PAYTON Ryan on 8/18/2020 at 9:43 AM

## 2020-08-18 NOTE — PATIENT INSTRUCTIONS
Patient Education        Back Care and Preventing Injuries: Care Instructions  Your Care Instructions     You can hurt your back doing many everyday activities: lifting a heavy box, bending down to garden, exercising at the gym, and even getting out of bed. But you can keep your back strong and healthy by doing some exercises. You also can follow a few tips for sitting, sleeping, and lifting to avoid hurting your back again. Talk to your doctor before you start an exercise program. Ask for help if you want to learn more about keeping your back healthy. Follow-up care is a key part of your treatment and safety. Be sure to make and go to all appointments, and call your doctor if you are having problems. It's also a good idea to know your test results and keep a list of the medicines you take. How can you care for yourself at home? · Stay at a healthy weight to avoid strain on your lower back. · Do not smoke. Smoking increases the risk of osteoporosis, which weakens the spine. If you need help quitting, talk to your doctor about stop-smoking programs and medicines. These can increase your chances of quitting for good. · Make sure you sleep in a position that maintains your back's normal curves and on a mattress that feels comfortable. Sleep on your side with a pillow between your knees, or sleep on your back with a pillow under your knees. These positions can reduce strain on your back. · When you get out of bed, lie on your side and bend both knees. Drop your feet over the edge of the bed as you push up with both arms. Scoot to the edge of the bed. Make sure your feet are in line with your rear end (buttocks), and then stand up. · If you must stand for a long time, put one foot on a stool, ledge, or box. Exercise to strengthen your back and other muscles  · Get at least 30 minutes of exercise on most days of the week. Walking is a good choice.  You also may want to do other activities, such as running, swimming, cycling, or playing tennis or team sports. · Stretch your back muscles. Here are few exercises to try:  ? Lie on your back with your knees bent and your feet flat on the floor. Gently pull one bent knee to your chest. Put that foot back on the floor, and then pull the other knee to your chest. Hold for 15 to 30 seconds. Repeat 2 to 4 times. ? Do pelvic tilts. Lie on your back with your knees bent. Tighten your stomach muscles. Pull your belly button (navel) in and up toward your ribs. You should feel like your back is pressing to the floor and your hips and pelvis are slightly lifting off the floor. Hold for 6 seconds while breathing smoothly. · Keep your core muscles strong. The muscles of your back, belly (abdomen), and buttocks support your spine. ? Pull in your belly, and imagine pulling your navel toward your spine. Hold this for 6 seconds, then relax. Remember to keep breathing normally as you tense your muscles. ? Do curl-ups. Always do them with your knees bent. Keep your low back on the floor, and curl your shoulders toward your knees using a smooth, slow motion. Keep your arms folded across your chest. If this bothers your neck, try putting your hands behind your neck (not your head), with your elbows spread apart. ? Lie on your back with your knees bent and your feet flat on the floor. Tighten your belly muscles, and then push with your feet and raise your buttocks up a few inches. Hold this position 6 seconds as you continue to breathe normally, then lower yourself slowly to the floor. Repeat 8 to 12 times. ? If you like group exercise, try Pilates or yoga. These classes have poses that strengthen the core muscles. Protect your back when you sit  · Place a small pillow, a rolled-up towel, or a lumbar roll in the curve of your back if you need extra support. · Sit in a chair that is low enough to let you place both feet flat on the floor with both knees nearly level with your hips.  If your chair or desk is too high, use a foot rest to raise your knees. · When driving, keep your knees nearly level with your hips. Sit straight, and drive with both hands on the steering wheel. Your arms should be in a slightly bent position. · Try a kneeling chair, which helps tilt your hips forward. This takes pressure off your lower back. · Try sitting on an exercise ball. It can rock from side to side, which helps keep your back loose. Lift properly  · Squat down, bending at the hips and knees only. If you need to, put one knee to the floor and extend your other knee in front of you, bent at a right angle (half kneeling). · Press your chest straight forward. This helps keep your upper back straight while keeping a slight arch in your low back. · Hold the load as close to your body as possible, at the level of your navel. · Use your feet to change direction, taking small steps. · Lead with your hips as you change direction. Keep your shoulders in line with your hips as you move. Do not twist your body. · Set down your load carefully, squatting with your knees and hips only. When should you call for help? Watch closely for changes in your health, and be sure to contact your doctor if you have any problems. Where can you learn more? Go to https://[employer].EventCombo. org and sign in to your [employer] account. Enter S810 in the [facility] box to learn more about \"Back Care and Preventing Injuries: Care Instructions. \"     If you do not have an account, please click on the \"Sign Up Now\" link. Current as of: March 2, 2020               Content Version: 12.5  © 6157-0299 Healthwise, Incorporated. Care instructions adapted under license by [facility] ([facility]). If you have questions about a medical condition or this instruction, always ask your healthcare professional. [address] any warranty or liability for your use of this information.

## 2020-08-21 ENCOUNTER — HOSPITAL ENCOUNTER (OUTPATIENT)
Dept: PHYSICAL THERAPY | Facility: CLINIC | Age: 40
Setting detail: THERAPIES SERIES
Discharge: HOME | End: 2020-08-21
Payer: MEDICARE

## 2020-08-21 PROCEDURE — 97110 THERAPEUTIC EXERCISES: CPT

## 2020-08-21 PROCEDURE — 97012 MECHANICAL TRACTION THERAPY: CPT

## 2020-08-21 NOTE — FLOWSHEET NOTE
[] [facility]) - [facility] &  Therapy  [address]  [phone]  F: [phone] [] [address]  [facility]   [address]  P: [phone]  F: [phone] [] [address] &  Therapy  [address] [facility]  P: [phone]  F: [phone] [] [address]  [facility]   [address]   [place]: [phone]  F: [phone]      Physical Therapy Daily Treatment Note    Date:  2020  Patient Name:  Stanislav Sheriff    :  1980  MRN: [id number]  Physician: Romain 14: [employer] 30 per yr  Medical Diagnosis:   Left lumbar radiculopathy       Rehab Codes: M54.16  Onset Date: 20 script                Next 's appt.:     Visit# / total visits:      Cancels/No Shows:     Subjective:     Pain:  [x] Yes  [] No Location: L piriformis/calf  Pain Rating: (0-10 scale) 3/10  Pain altered Tx:  [x] No  [] Yes  Action:  Comments: Pt states pain level is a little higher than normal today, in unsure why. States he is scheduled to have an MRI on .     Objective:   Todays Treatment:  Modalities: IFC to L LB - 15' with HP, pt in prone - not today  Mechanical Lumbar Traction with pt in supine - 40sec on 10sec off for 15min, 110lb pull  Precautions:  Exercises:  Exercise Reps/ Time Weight/ Level Comments    Bike 10'   -          Kneeling hip flex S 3x30\"   -   SB S 3x30\"      Step S 3x30\"   -    Supine inversion gastroc rope stretch  3x30\"   -   Supine HS 3x30\"  With strap  x   Piriformis S 3x30\"    x   LTR 10x10\"  L side only     Supine Hip Flexor S 2'   x          PPT X10, 5\"   x   PPT w/ marches, kicks x20 ea   x   PPT deadbug x20   x   PPT heel walks x20   x   Bridges  2x10   -   Clamshells  2x10 blue  -   SL hip ABD 2x10     -   Prone prop on elbows 3x1'    x   Prone hip extension  2x10  2 pillows under hips     Prone Glut Squeezes X20, 5\"   x          Quadruped Alt UE x20   x   Quadruped Alt LE x10   x   4-way hip x15 B blue  -   Paloff Press 2x10 red  -                                   Other:      Manual: Prone PA mobs                Hypervolt L gastroc - NT                DI L piriformis     Specific Instructions for next treatment: Manual, strength program,  Ambar method extension exercises        Treatment Charges: Mins Units   [x]  Modalities - [facility] traction 15 1   [x]  Ther Exercise 30 2   [x]  Manual Therapy     []  Ther Activities     []  Aquatics     []  Vasocompression     []  Other     Total Treatment time 45 3       Assessment: [x] Progressing toward goals. Continued to initiate session with traction, followed by stretches. Progressed DLS exercises with addition of heel walk outs and deadbugs. Increased time spent with prone prop today to promote relief of sx. Continues to require cues to avoid rotation during quadruped leg extension. Pt reports no change in pain post session. Is going out of town next week, then has MRI the following week on Monday, August 31.      [] No change. [] Other:  [x] Patient would continue to benefit from skilled physical therapy services in order to: address the following deficits    STG: (to be met in 12  treatments)  1. ? Pain: 2/10 at worst  2. ? ROM: Lumbar flexion WFL, improve HA/calf extensibility-progressing towards  3. ? Strength: Pt to demo good postural/core stability-progressing  4. ? Function: Able to transition from sit to stand w/o difficulty and deepika stationary position for >1HR  5. Patient to be independent with home exercise program as demonstrated by performance with correct form without cues. -progressing towards        Pt. Education:  [x]? Plans/Goals, Risks/Benefits discussed  [x]? Home exercise program  Method of Education: [x]? Verbal  [x]? Demo  []? Written  Comprehension of Education:  [x]? Verbalizes understanding. []? Demonstrates understanding. []? Needs Review. []? Demonstrates/verbalizes understanding of HEP/Ed previously given. Plan: [x] Continue current frequency toward long and short term goals.     [] Specific Instructions for subsequent treatments:       Time In: 8:01 am              Time Out: 8:57 am    Electronically signed by:  Faustina Blackburn PTA

## 2020-08-31 ENCOUNTER — HOSPITAL ENCOUNTER (OUTPATIENT)
Dept: MRI IMAGING | Facility: CLINIC | Age: 40
Discharge: HOME | End: 2020-09-02
Payer: MEDICARE

## 2020-08-31 PROCEDURE — 72148 MRI LUMBAR SPINE W/O DYE: CPT

## 2020-09-02 ENCOUNTER — HOSPITAL ENCOUNTER (OUTPATIENT)
Dept: PHYSICAL THERAPY | Facility: CLINIC | Age: 40
Setting detail: THERAPIES SERIES
Discharge: HOME | End: 2020-09-02
Payer: MEDICARE

## 2020-09-02 PROCEDURE — 97110 THERAPEUTIC EXERCISES: CPT

## 2020-09-02 PROCEDURE — 97012 MECHANICAL TRACTION THERAPY: CPT

## 2020-09-02 NOTE — FLOWSHEET NOTE
[] [facility]) [facility]  [facility]  [address]  [phone]  F: [phone] [] [address]  [facility]   [address]  P: [phone]  F: [phone] [] [address] &  Therapy  [address]  P: [phone]  F: [phone] [] [address]  [facility]   [address]   [place]: [phone]  F: [phone]      Physical Therapy Daily Treatment Note    Date:  2020  Patient Name:  Gordo Dooley     :  1980  MRN: [id number]  Physician: [address]: [facility] per yr  Medical Diagnosis:   Left lumbar radiculopathy       Rehab Codes: M54.16  Onset Date: 20 script                  Next 's appt.:     Visit# / total visits: 10/12     Cancels/No Shows:     Subjective:     Pain:  [x] Yes  [] No Location: L piriformis/calf  Pain Rating: (0-10 scale) 3/10  Pain altered Tx:  [x] No  [] Yes  Action:  Comments: Pt with no significant change in pain symptoms since his previous visit. Did get his MRI and results as noted in Epic. Has an appointment with his primary care physician and plan to schedule further visits if needed after that follow up. Objective:   Todays Treatment:  Modalities: IFC to L LB - 15' with HP, pt in prone - not today  Mechanical Lumbar Traction with pt in supine - 40sec on 10sec off for 15min, 110lb pull  Precautions:  Exercises:  Exercise Reps/ Time Weight/ Level Comments    Bike 10'   -          Kneeling hip flex S 3x30\"   -   SB S 3x30\"      Step S 3x30\"   -    Supine inversion gastroc rope stretch  3x30\"   -   Supine HS 3x30\"  With strap  x   Piriformis S 3x30\"    x   LTR 10x10\"  L side only     Supine Hip Flexor S 2'   x                        PPT X10, 5\"      PPT w/ marches, kicks x20 ea      PPT deadbug x20 x   PPT heel walks x20   x   Bridges  2x10   -   Clamshells  2x10 blue  -   SL hip ABD 2x10     -   Prone prop on elbows 3x2'   with intermitten press up x   Prone hip extension  2x10  2 pillows under hips  x   Prone UE lift x10 ea   x   Prone swimmers 5x ea   x   Prone Glut Squeezes X20, 5\"   x          Quadruped Alt UE x20   x   Quadruped Alt LE x10   x   4-way hip x15 B blue  -   Paloff Press 2x10 red  -                                   Other:        Specific Instructions for next treatment: Manual, strength program,  Ambar method extension exercises        Treatment Charges: Mins Units   [x]  Modalities - [facility] traction 15 1   [x]  Ther Exercise 30 2   [x]  Manual Therapy     []  Ther Activities     []  Aquatics     []  Vasocompression     []  Other     Total Treatment time 45 3       Assessment: [x] Progressing toward goals. Continued with manual traction to begin treatment with fair pt response. Followed up traction with supine stretches, pt expressing difficulty completing HS stretch d/t \"catch\" felt in LB. Additional prone ex completed to address centralizing pain, pt denies increased symptoms with reporting minor relief with prone position. Increased HS pain with transition from prone to standing. Attempted to complete stool HS stretch with unable to obtain straight knee. [] No change. [] Other:  [x] Patient would continue to benefit from skilled physical therapy services in order to: address the following deficits    STG: (to be met in 12  treatments)  1. ? Pain: 2/10 at worst  2. ? ROM: Lumbar flexion WFL, improve HA/calf extensibility-progressing towards  3. ? Strength: Pt to demo good postural/core stability-progressing  4. ? Function: Able to transition from sit to stand w/o difficulty and deepika stationary position for >1HR  5. Patient to be independent with home exercise program as demonstrated by performance with correct form without cues. -progressing towards        Pt. Education:  [x]? Plans/Goals, Risks/Benefits discussed  [x]? Home exercise program  Method of Education: [x]? Verbal  [x]? Demo  []? Written  Comprehension of Education:  [x]? Verbalizes understanding. []? Demonstrates understanding. []? Needs Review. []? Demonstrates/verbalizes understanding of HEP/Ed previously given. Plan: [x] Continue current frequency toward long and short term goals.     [] Specific Instructions for subsequent treatments:       Time In: 8:05 am              Time Out: 8:57 am    Electronically signed by:  Moiz Morgan PTA

## 2020-09-04 ENCOUNTER — OFFICE VISIT (OUTPATIENT)
Dept: FAMILY MEDICINE CLINIC | Age: 40
End: 2020-09-04
Payer: MEDICARE

## 2020-09-04 VITALS
BODY MASS INDEX: 34.5 KG/M2 | HEART RATE: 61 BPM | SYSTOLIC BLOOD PRESSURE: 126 MMHG | RESPIRATION RATE: 20 BRPM | WEIGHT: 254.4 LBS | DIASTOLIC BLOOD PRESSURE: 84 MMHG | TEMPERATURE: 96.3 F | OXYGEN SATURATION: 97 %

## 2020-09-04 PROCEDURE — 99213 OFFICE O/P EST LOW 20 MIN: CPT | Performed by: NURSE PRACTITIONER

## 2020-09-04 PROCEDURE — G8417 CALC BMI ABV UP PARAM F/U: HCPCS | Performed by: NURSE PRACTITIONER

## 2020-09-04 PROCEDURE — 1036F TOBACCO NON-USER: CPT | Performed by: NURSE PRACTITIONER

## 2020-09-04 PROCEDURE — G8427 DOCREV CUR MEDS BY ELIG CLIN: HCPCS | Performed by: NURSE PRACTITIONER

## 2020-09-04 NOTE — PATIENT INSTRUCTIONS
Patient Education        Herniated Disc: Exercises  Introduction  Here are some examples of exercises for you to try. The exercises may be suggested for a condition or for rehabilitation. Start each exercise slowly. Ease off the exercises if you start to have pain. You will be told when to start these exercises and which ones will work best for you. How to stay safe  These exercises can help you move easier and feel better. But when you first start doing them, you may have more pain in your back. This is normal. But it is important to pay close attention to your pain during and after each exercise. · Keep doing these exercises if your pain stays the same or moves from your leg and buttock more toward the middle of your spine. Pain moving out of your leg and buttock is a good sign. · Stop doing these exercises if your pain gets worse in your leg and buttock. Stop if you start to have pain in your leg and buttock that you didn't have before. Be sure to do these exercises in the order they appear. Note how your pain changes before you move to the next one. If your pain is much worse right after exercise and stays worse the next day, do not do any of these exercises. How to do the exercises  1. Rest on belly   1. Lie on your stomach, with your head turned to the side. 2. Try to relax your lower back muscles as much as you can. 3. Continue to lie on your stomach for 2 minutes. · Keep your arms beside your body. · If that position bothers your neck, place your hands, one on top of the other, underneath your forehead. This will help support your head and neck. If lying in this position causes or increases pain down your leg, stop this exercise and do not do the next exercises. 2. Press-up back extension   1. Lie on your stomach, with your face down and your elbows tucked into your sides and under your shoulders. 2. Press your elbows down into the floor to raise your upper back.   3. Hold this position for 15 to 30 seconds. 4. Repeat 2 to 4 times. · As you do this, relax your stomach muscles and allow your back to arch without using your back muscles. · Let your low back relax completely as you arch up. Don't let your hips or pelvis come off the floor. Then relax, and return to the start position. Over time, work up to staying in the press-up position for up to 2 minutes. If lying in this position causes or increases pain down your leg, stop this exercise and do not do the next exercises. 3. Full press-up back extension   1. Lie on your stomach with your face down, keeping your elbows tucked into your sides and under your shoulders. 2. Straighten your elbows, and push your upper body up as far as you can. 3. Hold this position for 5 seconds, and then relax. 4. Repeat 10 times. Allow your lower back to sag. Keep your hips, pelvis, and legs relaxed. Each time, try to raise your upper body a little higher and hold your arms a bit straighter. If lying in this position causes or increases pain down your leg, stop this exercise and do not do the next exercises. If you can't do this exercise, you may instead try the backward bend exercise that follows. 4. Backward bend   1. Stand with your feet hip-width apart, and don't lock your knees. 2. Place your hands in the small of your back. 3. Bend backward as far as you can, keeping your knees straight. 4. Repeat 2 to 4 times. Your toes should be pointing forward. Hold this position for 2 to 3 seconds. Then return to your starting position. Each time, try to bend backward a little farther, until you bend backward as far as you can. If standing in this position causes or increases pain down your leg, stop this exercise. Follow-up care is a key part of your treatment and safety. Be sure to make and go to all appointments, and call your doctor if you are having problems.  It's also a good idea to know your test results and keep a list of the medicines you take.  Where can you learn more? Go to https://chpepiceweb.healthQian Xiaoâ€™er. org and sign in to your [employer] account. Enter A013 in the [employer] box to learn more about \"Herniated Disc: Exercises. \"     If you do not have an account, please click on the \"Sign Up Now\" link. Current as of: March 2, 2020               Content Version: 12.5  © 2006-2020 Healthwise, Incorporated. Care instructions adapted under license by [facility] ([facility]). If you have questions about a medical condition or this instruction, always ask your healthcare professional. [address] any warranty or liability for your use of this information.

## 2020-09-04 NOTE — PROGRESS NOTES
[facility]   [address]  [phone]    9/7/2020    Visit Information    Have you changed or started any medications since your last visit including any over-the-counter medicines, vitamins, or herbal medicines? no   Are you having any side effects from any of your medications? -  no  Have you stopped taking any of your medications? Is so, why? -  no    Have you seen any other physician or provider since your last visit? Yes - Records Obtained PT  Have you had any other diagnostic tests since your last visit? Yes - Records Obtained MRI Lumbar Spine  Have you been seen in the emergency room and/or had an admission to a hospital since we last saw you? No  Have you had your routine dental cleaning in the past 6 months? yes -      Have you activated your "[employer] account? If not, what are your barriers? Yes     Patient Care Team:  TALIA Barrios CNP as PCP - General (Nurse Practitioner Family)  TALIA Barrios CNP as PCP - [facility] Provider      Leah Gould is a 47 y.o. male who presents today for his  medical conditions/complaints as noted below. Leah Gould is c/o of Discuss Labs (MRI Results) and Back Pain  . HPI:     Patient is a pleasant 61-year-old  male. He presents the office to follow-up regarding his MRI results of his lumbar spine. These results were resulted and called the patient. Unfortunately patient was never reached. Due to his ongoing concern he did make a follow-up appointment to discuss it. Patient's imaging results were reviewed with him in detail. Patient is open for neurosurgical consult to decipher if surgical intervention is appropriate at this time. He is still experiencing pain on a daily basis. Patient is refusing any medication for pain or muscle relaxers.   Patient's rationale is he is afraid that if he does not feel the pain he is going to make in a rational decision and potentially harm his back Thought Content: Thought content normal.         Cognition and Memory: Cognition and memory normal.         Judgment: Judgment normal.          Assessment:      1. Protrusion of lumbar intervertebral disc  - Angelica Geronimo CNP, Neurosurgery, [place]    2. Lumbar degenerative disc disease  - Angelica Geronimo CNP, Neurosurgery, [place]       Plan:      Return for Follow up as needed, Call if systems do not improve or get worse. Orders Placed This Encounter   Procedures   [address], [address], CNP, Neurosurgery, [place]     Referral Priority:   Urgent     Referral Type:   Eval and Treat     Referral Reason:   Specialty Services Required     Referred to Provider:   TALIA Krueger CNP     Requested Specialty:   [facility] Nurse Practitioner     Number of Visits Requested:   1     No orders of the defined types were placed in this encounter. Reviewed health maintenance, prior labs and imaging. Patient given educational materials - see patient instructions. Discussed use, benefit, and side effects of prescribed medications. Barriers to medication compliance addressed. All patient questions answered. Pt voiced understanding to plan of care. Instructed to continue medications as discussed, healthy diet and exercise. Patient agreed with treatment plan. Follow up as directed below. Communication:      Items for Patient/Writer/Staff to Accomplish  We will send patient for neurosurgical evaluation for potential surgical intervention. Quality Measures  BMI Readings from Last 1 Encounters:   09/04/20 34.50 kg/m²        No results found for: LABA1C    BP Readings from Last 3 Encounters:   09/04/20 126/84   08/18/20 110/82   07/17/20 132/86        The ASCVD Risk score (Jori aWlker, et al., 2013) failed to calculate for the following reasons:     The 2013 ASCVD risk score is only valid for ages 36 to 78     PHQ Scores 3/5/2020 4/30/2018   PHQ2 Score 0 0   PHQ9 Score 0 0 Interpretation of Total Score Depression Severity: 1-4 = Minimal depression, 5-9 = Mild depression, 10-14 = Moderate depression, 15-19 = Moderately severe depression, 20-27 = Severe depression     Electronically signed by PAYTON Santos on 9/7/2020 at 4:24 PM

## 2020-09-07 ASSESSMENT — ENCOUNTER SYMPTOMS
BACK PAIN: 1
WHEEZING: 0
SHORTNESS OF BREATH: 0
COUGH: 0
CHEST TIGHTNESS: 0

## 2020-09-24 ENCOUNTER — OFFICE VISIT (OUTPATIENT)
Dept: NEUROSURGERY | Age: 40
End: 2020-09-24
Payer: MEDICARE

## 2020-09-24 ENCOUNTER — HOSPITAL ENCOUNTER (OUTPATIENT)
Dept: GENERAL RADIOLOGY | Age: 40
Discharge: HOME | End: 2020-09-26
Payer: MEDICARE

## 2020-09-24 ENCOUNTER — HOSPITAL ENCOUNTER (OUTPATIENT)
Age: 40
Discharge: HOME | End: 2020-09-26
Payer: MEDICARE

## 2020-09-24 VITALS
BODY MASS INDEX: 33.86 KG/M2 | HEIGHT: 72 IN | WEIGHT: 250 LBS | SYSTOLIC BLOOD PRESSURE: 126 MMHG | HEART RATE: 54 BPM | DIASTOLIC BLOOD PRESSURE: 76 MMHG | TEMPERATURE: 97.9 F

## 2020-09-24 PROCEDURE — G8427 DOCREV CUR MEDS BY ELIG CLIN: HCPCS | Performed by: NURSE PRACTITIONER

## 2020-09-24 PROCEDURE — 1036F TOBACCO NON-USER: CPT | Performed by: NURSE PRACTITIONER

## 2020-09-24 PROCEDURE — G8417 CALC BMI ABV UP PARAM F/U: HCPCS | Performed by: NURSE PRACTITIONER

## 2020-09-24 PROCEDURE — 72120 X-RAY BEND ONLY L-S SPINE: CPT

## 2020-09-24 PROCEDURE — 99203 OFFICE O/P NEW LOW 30 MIN: CPT | Performed by: NURSE PRACTITIONER

## 2020-09-24 NOTE — PROGRESS NOTES
Jonathan Bai  [facility] [address] [facility] [address] [id number]  Dept: [phone]    Patient:  Jeromy Frost  YOB: 1980  Date: 20    The patient is a 47 y.o. male who presents today for consult of the following problems:     Chief Complaint   Patient presents with    New Patient     Protrusion of lumbar intervertebral disc Lumbar Degenerative Disease    Results     MRI Lumbar 20         HPI:     Jeromy Frost is a 47 y.o. male on whom neurosurgical consultation was requested by TALIA Shook CNP for management of back and leg pain. Pt has had low back pain for >10, varying in intensity. Has had numerous rounds of PT, chiropractor visits, injections over the years. Symptoms have waxed and waned over the years. Did follow with the [place] clinic, did have approximately 3 injections in the remote past, reports temporary improvement with lumbar epidurals. Had worsening of symptoms over the last 4 months. Has completed multimodal physical therapy since onset of exacerbation, as well as multiple chiropractor visits with no improvement. Oral/IM steroids provided some temporary improvement, but pain is now returning to baseline severity. Pain is improved with lying down, worsened with position changing. Groin pain: No  Saddle anesthesia: No  Hip Pain: Yes-left  Bowel/bladder incontinence: No  Constipation or Urinary retention: No    LIMITATIONS    Pain significantly limiting from a daily standpoint.    Assistive devices: none  Daily pharmacologic pain control include: none  Back versus le/50    MANAGEMENT     Prior Surgery: No  Prior to 1yr ago: multiple epidurals through the [facility]-most relief from epidural- ~3 weeks with some improvement  In the last year:    Physical Therapy: Yes   Chiropractic Interventions: Yes   Injections: No  Improvement: n/a    Types of injections/responses: n/a    History:     Past Medical History: Diagnosis Date    Annular tear of lumbar disc 2016    Lumbar degenerative disc disease 2016    Protrusion of lumbar intervertebral disc 2016     Past Surgical History:   Procedure Laterality Date    VASECTOMY      WISDOM TOOTH EXTRACTION       Family History   Problem Relation Age of Onset    High Blood Pressure Father     Heart Attack Paternal Grandfather      Current Outpatient Medications on File Prior to Visit   Medication Sig Dispense Refill    ibuprofen (ADVIL;MOTRIN) 200 MG tablet Take 200 mg by mouth every 6 hours as needed for Pain      acetaminophen (TYLENOL) 500 MG tablet Take 500 mg by mouth every 6 hours as needed for Pain      Menthol, Topical Analgesic, (BIOFREEZE EX) Apply topically       No current facility-administered medications on file prior to visit. Social History     Tobacco Use    Smoking status: Former Smoker     Packs/day: 0.25     Years: 3.00     Pack years: 0.75     Types: Cigarettes     Last attempt to quit: 2007     Years since quittin.7    Smokeless tobacco: Former User     Types: Chew   Substance Use Topics    Alcohol use: Not on file    Drug use: Not on file       No Known Allergies    Review of Systems  Constitutional: Negative for activity change and appetite change. HENT: Negative for ear pain and facial swelling. Eyes: Negative for discharge and itching. Respiratory: Negative for choking and chest tightness. Cardiovascular: Negative for chest pain and leg swelling. Gastrointestinal: Negative for nausea and abdominal pain. Endocrine: Negative for cold intolerance and heat intolerance. Genitourinary: Negative for frequency and flank pain. Musculoskeletal: Negative for myalgias and joint swelling. Skin: Negative for rash and wound. Allergic/Immunologic: Negative for environmental allergies and food allergies. Hematological: Negative for adenopathy. Does not bruise/bleed easily.    Psychiatric/Behavioral: Negative for self-injury. The patient is not nervous/anxious. Physical Exam:      /76 (Site: Left Upper Arm, Position: Sitting, Cuff Size: Medium Adult)   Pulse 54   Temp 97.9 °F (36.6 °C) (Temporal)   Ht 6' (1.829 m)   Wt 250 lb (113.4 kg)   BMI 33.91 kg/m²   Estimated body mass index is 33.91 kg/m² as calculated from the following:    Height as of this encounter: 6' (1.829 m). Weight as of this encounter: 250 lb (113.4 kg). General:  Vlad Salter is a 47y.o. year old male who appears his stated age. HEENT: Normocephalic atraumatic. Neck supple. Chest: regular rate; pulses equal  Abdomen: Soft nontender nondistended. Ext: DP and PT pulses 2+, good cap refill  Neuro    Mentation  Appropriate affect  Registration intact  Orientation intact    Cranial Nerves:   Pupils equal and reactive to light  Extraocular motion intact  Face and shrug symmetric  Tongue midline  No dysarthria  v1-3 sensation symmetric, masseter tone symmetric  Hearing symmetric and intact    Sensation: Mild decrease in L5 distribution, left    Motor  L deltoid 5/5; R deltoid 5/5  L biceps 5/5; R biceps 5/5  L triceps 5/5; R triceps 5/5  L wrist extension 5/5; R wrist extension 5/5  L intrinsics 5/5; R intrinsics 5/5     L iliopsoas 5/5 , R iliopsoas 5/5  L quadriceps 5/5; R quadriceps 5/5  L Dorsiflexion 5/5; R dorsiflexion 5/5  L Plantarflexion 5/5; R plantarflexion 5/5  L EHL 5/5; R EHL 5/5    Reflexes  L Brachioradialis 2+/4; R brachioradialis 2+/4  L Biceps 2+/4; R Biceps 2+/4  L Triceps 2+/4; R Triceps 2+/4  L Patellar 2+/4: R Patellar 1+/4  L Achilles 2+/4; R Achilles 2+/4    hoffmans L: neg  hoffmans R: neg  Clonus L: neg  Clonus R: neg  Babinski L: neg  Babinski R: neg    JOSE: Negative  Straight leg raise: Positive left side  SI joint tenderness: Negative    Studies Review:     MRI lumbar spine 8/31/2020:  FINDINGS:    BONES/ALIGNMENT: There is normal alignment of the spine.  The vertebral body    heights are maintained. The bone marrow signal appears unremarkable.  There    is disc desiccation at L3-4 through L5-S1.  Mild disc space narrowing seen at    these levels.         SPINAL CORD: The conus terminates normally.         SOFT TISSUES: No significant paraspinal mass identified.  A    well-circumscribed homogeneous fat density mass is seen in the posterior    subcutaneous fat at the T12-L1 level measuring 2.4 by 5.8 cm, consistent with    a lipoma.         L1-L2: There is no significant disc herniation, spinal canal stenosis or    neural foraminal narrowing.         L2-L3: There is no significant disc herniation, spinal canal stenosis or    neural foraminal narrowing.         L3-L4: There is mild disc bulge with annular fissure and mild bilateral    posterior facet degenerative change.  This causes slight impression on the    ventral portion of the thecal sac without significant central canal stenosis    or neural foraminal encroachment.         L4-L5: Moderate bilateral posterior facet degenerative change.  There is a    central and left posterolateral disc protrusion causing severe left    subarticular recess stenosis.  This could compress the left L5 nerve root. There is slight inferior extension of disc fragment below the L4-5 level.  No    significant neural foraminal narrowing or central canal stenosis.         L5-S1: There is a disc bulge with small left paramedian disc protrusion. This is of uncertain significance and does not appear to cause any    compression of the nerve roots and there is only minimal impression on the    thecal sac.  No significant central canal stenosis seen. Charlynne Plane is no    evidence of neural foraminal encroachment.  Mild bilateral posterior facet    degenerative change. Assessment and Plan:      1. Lumbar disc herniation with radiculopathy          Plan: Patient presents with longstanding low back pain with left lower extremity radiculopathy.   Pain has been severe for the last 4 months. Has completed physical therapy, multiple chiropractic visits, various medications including oral steroid with no lasting improvement in symptoms. Pain is present along left L5 distribution, this does correspond with disc protrusion with inferior migration at left L4-5. Does have a remote history of injections, reports some temporary improvement with epidurals, these were done through the [facility], records not available. No saddle anesthesia, loss of bowel or bladder function. Recommend evaluation by pain management for consideration of left L5 transforaminal injection. Patient to return in 4-5 weeks after pain management intervention for appointment with surgeon. Followup: Return in about 4 weeks (around 10/22/2020), or if symptoms worsen or fail to improve. Prescriptions Ordered:  No orders of the defined types were placed in this encounter. Orders Placed:  Orders Placed This Encounter   Procedures    XR LUMBAR SPINE (2-3 VIEWS)     Standing Status:   Future     Standing Expiration Date:   9/24/2021     Scheduling Instructions:      Standing lateral: flexion, extension, and neutral with both femoral heads     Order Specific Question:   Reason for exam:     Answer:   evaluate for instability   [address] Donald MD, Pain Management, [place]     Referral Priority:   Routine     Referral Type:   Eval and Treat     Referral Reason:   Specialty Services Required     Referred to Provider:   Blayne Vidales MD     Requested Specialty:   Pain Management     Number of Visits Requested:   1        Electronically signed by TALIA Miller CNP on 9/24/2020 at 3:17 PM    Please note that this chart was generated using voice recognition Dragon dictation software. Although every effort was made to ensure the accuracy of this automated transcription, some errors in transcription may have occurred.

## 2020-10-20 ENCOUNTER — OFFICE VISIT (OUTPATIENT)
Dept: PAIN MANAGEMENT | Age: 40
End: 2020-10-20
Payer: MEDICARE

## 2020-10-20 VITALS
SYSTOLIC BLOOD PRESSURE: 144 MMHG | HEART RATE: 57 BPM | BODY MASS INDEX: 34.81 KG/M2 | HEIGHT: 72 IN | TEMPERATURE: 97.4 F | WEIGHT: 257 LBS | OXYGEN SATURATION: 100 % | DIASTOLIC BLOOD PRESSURE: 94 MMHG

## 2020-10-20 PROBLEM — M54.42 CHRONIC BILATERAL LOW BACK PAIN WITH LEFT-SIDED SCIATICA: Status: ACTIVE | Noted: 2020-10-20

## 2020-10-20 PROBLEM — G89.29 CHRONIC BILATERAL LOW BACK PAIN WITH LEFT-SIDED SCIATICA: Status: ACTIVE | Noted: 2020-10-20

## 2020-10-20 PROCEDURE — G8484 FLU IMMUNIZE NO ADMIN: HCPCS | Performed by: ANESTHESIOLOGY

## 2020-10-20 PROCEDURE — 99244 OFF/OP CNSLTJ NEW/EST MOD 40: CPT | Performed by: ANESTHESIOLOGY

## 2020-10-20 PROCEDURE — G8417 CALC BMI ABV UP PARAM F/U: HCPCS | Performed by: ANESTHESIOLOGY

## 2020-10-20 PROCEDURE — G8427 DOCREV CUR MEDS BY ELIG CLIN: HCPCS | Performed by: ANESTHESIOLOGY

## 2020-10-20 ASSESSMENT — ENCOUNTER SYMPTOMS
RESPIRATORY NEGATIVE: 1
GASTROINTESTINAL NEGATIVE: 1
BACK PAIN: 1
EYES NEGATIVE: 1
ALLERGIC/IMMUNOLOGIC NEGATIVE: 1

## 2020-10-20 NOTE — PROGRESS NOTES
The patient is a 47 y. o. Non-/non  male. Chief Complaint   Patient presents with    Back Pain    Consultation        HPI    Requesting physician for the evaluation of Jodi Chacon 1980:  Nile Kayser, APRN - CNP     Pain History  -42-year-old man with history of chronic lower back pain  Onset of symptoms 15 years ago  Report intermittent flareup of his back pain  This recent flareup started nearly 6 months ago  Pain located in the lumbar area across midline left more than right  Radiation of pain down left leg all the way to the foot  Associated symptoms include left leg numbness tingling and weakness  Denies any changes in bladder or bowel control  No history of fever chills or weight loss  Describes the pain as constant aching throbbing sharp shooting sensation  Pain aggravated with lifting bending twisting turning changing position walking  Alleviating factors include rest lying down  Patient recall paraspinal injection series of 3 epidural 5 years ago which were very helpful in a [facility]  No previous lumbar spine surgical history  Has done multiple courses of physical therapy  Recently completed therapy without any improvement  Have tried chiropractic treatment  Was recently evaluated by the neurosurgeon at [place] and is advised for epidural injection  Have a recent MRI lumbar spine, report is available in [facility] Everywhere  Pain score today  3  1. Location:Lumbar  2. Radiation:left leg  3. Character:penetrating, nagging, numb, aching, throbbing,shooting,sharp, burning  5. Duration:constant, several times daily  6. Onset: 2006, worsened 4 months ago  7. Did an injury cause pain: no  8. Aggravating factors:sitting, standing, position,bending, walking  9. Alleviating factors:rest, reposition, lying down  10.  Associated symptoms (numbness / tingling / weakness):  yes  -Where at:Left leg  -Down into finger tips or toes (specify which finger or toes):no  -constant or intermitting: intermittent  11. Red Flags: (weight loss / chills / loss of bladder or bowel control):none    Previous management history  1. Previous diagnostic workup: (Imaging/EMG)   CT, MRI, or Xray: MRI  What part of the body:Lumbar  What facility [facility] they [facility]  What year or specific date: 2020  EMG:  no    2. Previous non interventional treatments tried:  chiropractor or physical therapy: Chiropractor & PT  What part of the body:Lumbar  What facility was it done at: [facility] @ [address] [place] [address] Venkata Stringer [place] done at [facility]  How long ago was it last tried:weeks  Did it work: No  Did they complete it:Yes    3. Previous Medications tried  NSAID's: yes  Neurontin: yes, \"didn't like the side effects\"  Lyrica: no  Trycyclic antidepressant (Ellavil / Pamelor ): no  Cymbalta: no  Opioids (Ultram / Vicodin / Percocet / Morphine / Dilaudid / Oramorph/ Fentanyl etc.):Percocet, vicodin  Last Pain medication taken (name of med and date): Ibuprofen 200 mg and tylenol last taken 10/19/20    4. Previous Interventional pain procedures tried:  What kind of injection:Lumbar GRACIE x 3  Who did the injection: at the [facility]  did the injection help: yes  Last time injection was done:2016    5.  Previous surgeries for pain  What part of the body did they have the surgery:n/a  What physician did the surgery:n/a  What Facility did they have the surgery done:n/a  Date of Surgery:N/A    Social History:  Marital status:   Employment History:, Teacher  Working  Yes  Full time Or Part time: Full time  Disability  No   Legal Issues related to pain complaint: No     Pain Disability Index score : 20        Informant: patient        Past Medical History:   Diagnosis Date    Annular tear of lumbar disc 4/25/2016    Lumbar degenerative disc disease 4/25/2016    Protrusion of lumbar intervertebral disc 4/25/2016      Past Surgical History:   Procedure Laterality Date    VASECTOMY      WISDOM TOOTH EXTRACTION       Social History Socioeconomic History    Marital status:      Spouse name: None    Number of children: None    Years of education: None    Highest education level: None   Occupational History    None   Social Needs    Financial resource strain: None    Food insecurity     Worry: None     Inability: None    Transportation needs     Medical: None     Non-medical: None   Tobacco Use    Smoking status: Former Smoker     Packs/day: 0.25     Years: 3.00     Pack years: 0.75     Types: Cigarettes     Last attempt to quit: 2007     Years since quittin.8    Smokeless tobacco: Former User     Types: Chew   Substance and Sexual Activity    Alcohol use: None    Drug use: None    Sexual activity: None   Lifestyle    Physical activity     Days per week: None     Minutes per session: None    Stress: None   Relationships    Social connections     Talks on phone: None     Gets together: None     Attends Yazdanism service: None     Active member of club or organization: None     Attends meetings of clubs or organizations: None     Relationship status: None    Intimate partner violence     Fear of current or ex partner: None     Emotionally abused: None     Physically abused: None     Forced sexual activity: None   Other Topics Concern    None   Social History Narrative    None     Family History   Problem Relation Age of Onset    High Blood Pressure Father     Heart Attack Paternal Grandfather      No Known Allergies  Patient has no known allergies.    Vitals:    10/20/20 1231   BP: (!) 144/94   Pulse: 57   Temp: 97.4 °F (36.3 °C)   SpO2: 100%     Current Outpatient Medications   Medication Sig Dispense Refill    Elastic Bandages & Supports (LUMBAR BACK BRACE/SUPPORT PAD) MISC 1 Units by Does not apply route daily PNEUMATIC OFFLOADING BACK BRACE 30 each 0    ibuprofen (ADVIL;MOTRIN) 200 MG tablet Take 200 mg by mouth every 6 hours as needed for Pain      acetaminophen (TYLENOL) 500 MG tablet Take 500 mg by mouth every 6 hours as needed for Pain      Menthol, Topical Analgesic, (BIOFREEZE EX) Apply topically       No current facility-administered medications for this visit. Review of Systems   Constitutional: Negative. Negative for fever. HENT: Negative. Eyes: Negative. Respiratory: Negative. Cardiovascular: Negative. Gastrointestinal: Negative. Endocrine: Negative. Genitourinary: Negative. Musculoskeletal: Positive for arthralgias, back pain, neck pain and neck stiffness. Skin: Negative. Allergic/Immunologic: Negative. Neurological: Positive for weakness, numbness and headaches. Psychiatric/Behavioral: The patient is nervous/anxious. All other systems reviewed and are negative. Objective:  General Appearance:  Well-appearing, in no acute distress, uncomfortable and in pain. Vital signs: (most recent): Blood pressure (!) 144/94, pulse 57, temperature 97.4 °F (36.3 °C), temperature source Infrared, height 6' (1.829 m), weight 257 lb (116.6 kg), SpO2 100 %. Vital signs are normal.  No fever. Output: Producing urine and producing stool. HEENT: Normal HEENT exam.    Lungs:  Normal effort and normal respiratory rate. Breath sounds clear to auscultation. He is not in respiratory distress. No decreased breath sounds. Heart: Normal rate. Regular rhythm. Abdomen: Abdomen is soft. There is no abdominal tenderness. Extremities: Normal range of motion. There is no deformity. Neurological: Patient is alert and oriented to person, place and time. Normal strength. Patient has normal reflexes, normal muscle tone and normal coordination. Pupils:  Pupils are equal, round, and reactive to light. Pupils are equal.   Skin:  Warm and dry. No rash or cyanosis.    Spine examination  No apparent deformity on inspection  Range of motion is within normal limits  Pain aggravation with forward flexion and left lateral bend bending  Straight leg raise positive on left side  Gait is stable    Neurological examination  Normal muscle mass, normal muscle tone, muscle strength 5/5 in both lower extremities in all major muscle group  Deep tendon reflexes 2+ and symmetric  Clonus negative    Assessment & Plan   Pain History  -8year-old man with history of chronic lower back pain  Onset of symptoms 15 years ago  Report intermittent flareup of his back pain  This recent flareup started nearly 6 months ago  Pain located in the lumbar area across midline left more than right  Radiation of pain down left leg all the way to the foot  Associated symptoms include left leg numbness tingling and weakness  Denies any changes in bladder or bowel control  No history of fever chills or weight loss  Describes the pain as constant aching throbbing sharp shooting sensation  Pain aggravated with lifting bending twisting turning changing position walking  Alleviating factors include rest lying down  Patient recall paraspinal injection series of 3 epidural 5 years ago which were very helpful in a [facility]  No previous lumbar spine surgical history  Has done multiple courses of physical therapy  Recently completed therapy without any improvement  Have tried chiropractic treatment  Was recently evaluated by the neurosurgeon at [place] and is advised for epidural injection  Have a recent [facility] lumbar spine[facility]  Pain score today  3    Physical Therapy Daily Treatment Note     Date:  2020  Patient Name:  Regina Brown                                 :  1980                       MRN: [id number]  Physician: Homa Paulino Ave: KI BRIZUELA 30 per yr  Medical Diagnosis:   Left lumbar radiculopathy       Rehab Codes: M54.16  Onset Date: 20 script                                 Next 's appt.:      Visit# / total visits: 10/12                                Cancels/No Shows: EXAMINATION: XRAY VIEWS OF THE LUMBAR SPINE  9/24/2020 3:39 pm    FINDINGS: Lumbar vertebral body height and alignment is maintained. No instability with flexion or extension. Mild disc space narrowing and endplate degenerative changes at L4-L5 and L5-S1. Mild facet degenerative changes within the lower lumbar spine. EXAMINATION: MRI OF THE LUMBAR SPINE WITHOUT CONTRAST, 8/31/2020 9:05 am    L3-L4: There is mild disc bulge with annular fissure and mild bilateral posterior facet degenerative change. This causes slight impression on the ventral portion of the thecal sac without significant central canal stenosis or neural foraminal encroachment. L4-L5: Moderate bilateral posterior facet degenerative change. There is a central and left posterolateral disc protrusion causing severe left subarticular recess stenosis. This could compress the left L5 nerve root. There is slight inferior extension of disc fragment below the L4-5 level. No significant neural foraminal narrowing or central canal stenosis. L5-S1: There is a disc bulge with small left paramedian disc protrusion. This is of uncertain significance and does not appear to cause any compression of the nerve roots and there is only minimal impression on the thecal sac. No significant central canal stenosis seen. There is no evidence of neural foraminal encroachment. Mild bilateral posterior facet degenerative change. 1. Lumbar disc herniation    2.  Chronic bilateral low back pain with left-sided sciatica    MRI lumbar spine  Report reviewed  Images reviewed independently  Images were shown to the patient  Pertinent finding discussed with patient  Most significant finding that concordant with his symptom is left-sided L4-L5 disc herniation with impingement and of the exiting left L4 nerve root and displacement of the traversing nerve root    He has failed conservative measures  Pain is severe and interferes with quality of life  I will

## 2020-11-16 ENCOUNTER — APPOINTMENT (OUTPATIENT)
Dept: GENERAL RADIOLOGY | Age: 40
End: 2020-11-16
Attending: ANESTHESIOLOGY
Payer: MEDICARE

## 2020-11-16 ENCOUNTER — HOSPITAL ENCOUNTER (OUTPATIENT)
Age: 40
Setting detail: OUTPATIENT SURGERY
Discharge: HOME | End: 2020-11-16
Attending: ANESTHESIOLOGY | Admitting: ANESTHESIOLOGY
Payer: MEDICARE

## 2020-11-16 VITALS
HEIGHT: 72 IN | SYSTOLIC BLOOD PRESSURE: 129 MMHG | OXYGEN SATURATION: 99 % | DIASTOLIC BLOOD PRESSURE: 70 MMHG | BODY MASS INDEX: 34.81 KG/M2 | RESPIRATION RATE: 16 BRPM | HEART RATE: 62 BPM | WEIGHT: 257 LBS | TEMPERATURE: 97.2 F

## 2020-11-16 PROBLEM — G89.29 CHRONIC BILATERAL LOW BACK PAIN WITH LEFT-SIDED SCIATICA: Chronic | Status: ACTIVE | Noted: 2020-10-20

## 2020-11-16 PROBLEM — M54.42 CHRONIC BILATERAL LOW BACK PAIN WITH LEFT-SIDED SCIATICA: Chronic | Status: ACTIVE | Noted: 2020-10-20

## 2020-11-16 PROCEDURE — 64484 NJX AA&/STRD TFRM EPI L/S EA: CPT | Performed by: ANESTHESIOLOGY

## 2020-11-16 PROCEDURE — 6360000004 HC RX CONTRAST MEDICATION: Performed by: ANESTHESIOLOGY

## 2020-11-16 PROCEDURE — 7100000011 HC PHASE II RECOVERY - ADDTL 15 MIN: Performed by: ANESTHESIOLOGY

## 2020-11-16 PROCEDURE — 3600000050 HC PAIN LEVEL 1 BASE: Performed by: ANESTHESIOLOGY

## 2020-11-16 PROCEDURE — 3209999900 FLUORO FOR SURGICAL PROCEDURES

## 2020-11-16 PROCEDURE — 7100000010 HC PHASE II RECOVERY - FIRST 15 MIN: Performed by: ANESTHESIOLOGY

## 2020-11-16 PROCEDURE — 99152 MOD SED SAME PHYS/QHP 5/>YRS: CPT | Performed by: ANESTHESIOLOGY

## 2020-11-16 PROCEDURE — 6360000002 HC RX W HCPCS: Performed by: ANESTHESIOLOGY

## 2020-11-16 PROCEDURE — 2500000003 HC RX 250 WO HCPCS: Performed by: ANESTHESIOLOGY

## 2020-11-16 PROCEDURE — 2709999900 HC NON-CHARGEABLE SUPPLY: Performed by: ANESTHESIOLOGY

## 2020-11-16 PROCEDURE — 64483 NJX AA&/STRD TFRM EPI L/S 1: CPT | Performed by: ANESTHESIOLOGY

## 2020-11-16 RX ORDER — SODIUM CHLORIDE 0.9 % (FLUSH) 0.9 %
10 SYRINGE (ML) INJECTION EVERY 12 HOURS SCHEDULED
Status: DISCONTINUED | OUTPATIENT
Start: 2020-11-16 | End: 2020-11-16 | Stop reason: HOSPADM

## 2020-11-16 RX ORDER — SODIUM CHLORIDE 0.9 % (FLUSH) 0.9 %
10 SYRINGE (ML) INJECTION PRN
Status: DISCONTINUED | OUTPATIENT
Start: 2020-11-16 | End: 2020-11-16 | Stop reason: HOSPADM

## 2020-11-16 RX ORDER — LIDOCAINE HYDROCHLORIDE 10 MG/ML
INJECTION, SOLUTION EPIDURAL; INFILTRATION; INTRACAUDAL; PERINEURAL PRN
Status: DISCONTINUED | OUTPATIENT
Start: 2020-11-16 | End: 2020-11-16 | Stop reason: ALTCHOICE

## 2020-11-16 RX ORDER — MIDAZOLAM HYDROCHLORIDE 1 MG/ML
INJECTION INTRAMUSCULAR; INTRAVENOUS PRN
Status: DISCONTINUED | OUTPATIENT
Start: 2020-11-16 | End: 2020-11-16 | Stop reason: ALTCHOICE

## 2020-11-16 RX ORDER — FENTANYL CITRATE 50 UG/ML
INJECTION, SOLUTION INTRAMUSCULAR; INTRAVENOUS PRN
Status: DISCONTINUED | OUTPATIENT
Start: 2020-11-16 | End: 2020-11-16 | Stop reason: ALTCHOICE

## 2020-11-16 RX ORDER — DEXAMETHASONE SODIUM PHOSPHATE 10 MG/ML
INJECTION INTRAMUSCULAR; INTRAVENOUS PRN
Status: DISCONTINUED | OUTPATIENT
Start: 2020-11-16 | End: 2020-11-16 | Stop reason: ALTCHOICE

## 2020-11-16 ASSESSMENT — PAIN DESCRIPTION - DESCRIPTORS: DESCRIPTORS: ACHING

## 2020-11-16 ASSESSMENT — PAIN DESCRIPTION - PAIN TYPE: TYPE: CHRONIC PAIN

## 2020-11-16 ASSESSMENT — PAIN SCALES - GENERAL
PAINLEVEL_OUTOF10: 0
PAINLEVEL_OUTOF10: 0
PAINLEVEL_OUTOF10: 3
PAINLEVEL_OUTOF10: 0
PAINLEVEL_OUTOF10: 3
PAINLEVEL_OUTOF10: 3

## 2020-11-16 ASSESSMENT — PAIN DESCRIPTION - ORIENTATION: ORIENTATION: LOWER

## 2020-11-16 ASSESSMENT — PAIN DESCRIPTION - LOCATION: LOCATION: BACK

## 2020-11-16 NOTE — OP NOTE
Operative Note      Patient: Marion Gerber  YOB: 1980  MRN: [id number]    Date of Procedure: 11/16/2020    Pre-Op Diagnosis: DX LUMBAR DISC HERNIATION      CHRONIC BILATERAL LOW BACK PAIN WITH LEFT SIDED SCIATICA    Post-Op Diagnosis: Same       Procedure(s):  LEFT L4 AND L5 EPIDURAL STEROID INJECTION    Surgeon(s):  Arlet Schafer MD    Assistant:   * No surgical staff found *    Anesthesia: IV Sedation    Estimated Blood Loss (mL): Minimal    Complications: None    Specimens:   * No specimens in log *    Implants:  * No implants in log *      Drains: * No LDAs found *    Findings: n/a    Detailed Description of Procedure:     Patient Name: Marion Gerber   YOB: 1980  Room/Bed: [facility] OR Pool/NONE  Medical Record Number: [id number]  Date: 11/16/2020       Preoperative Diagnosis:    Lumbar Disc herniation  Chronic low back pain with left-sided sciatica    Postoperative diagnosis:   Lumbar Disc herniation  Chronic low back pain with left-sided sciatica    Blood Loss: none    Procedure Performed:  Transforaminal lumbar epidural steroid injection at the levels of L5 AND S1 on the Left side under fluoroscopic guidance. Procedure: The Patient was seen in the preop area, chart was reviewed, informed consent was obtained. Patient was taken to procedure room and was placed in prone position. Vital signs were monitored through out the  Procedure. A time out was completed. The skin over the back was prepped and draped in sterile manner. The target point was marked in ipsilateral obliques just below the pedicle at the target levels. Skin and deep tissues were anesthetized with 1 % lidocaine. A 20-gauge, spinal needle was advanced  under fluoroscopy guidance in AP / Oblique and lateral views, such that the tip of the needle lies in the neuroforamina at about the 6 o'clock position under the pedicle of the target vertebra. This was confirmed with AP and lateral views of the fluoroscopy.      Then after negative aspiration contrast dye Omnipaque-180 was injected with live fluoroscopy in AP views that showed  spread of the contrast in the epidural space  and no vascular runoff or intrathecal spread. Finally 3 ml of treatment solution containing 5 ml of  1 % PF lidocaine and 1 ml of dexamethasone 10 mg / ml was injected. The needle was removed and a Band-Aid was placed over the needle  insertion site. The patient's vital signs remained stable and the patient tolerated the procedure well. The same procedure was then repeated at left at S 1 level with same technique. The remaining 3 ml of treatment solution was injected at that. The total dose of steroid injected today was dexamethasone 10 mg. Electronically signed by Arlet Schafer MD on 11/16/2020 at 10:36 AM    SEDATION NOTE:    ASA CLASSIFICATION  2  MP   CLASSIFICATION  2    · Moderate intravenous conscious sedation was supervised by Dr. Ricci James  . The patient was independently monitored by a Registered Nurse assigned to the Procedure Room  . Monitoring included automated blood pressure, continuous EKG, Capnography and continuous pulse oximetry. . The detailed Conscious Record is permanently stored in the [place] [id number].      The following is the conscious sedation record;  Start Time:  1022  End times:  1036  Duration:  14 MINUTES  MEDS GIVEN 2 MG VERSED  MCG FENTANYL        Electronically signed by Arlet Schafer MD on 11/16/2020 at 10:36 AM

## 2020-11-16 NOTE — H&P
History and Physical Update    Pt Name: Pantera Carter  MRN: [id number]  [place]: 1980  Date of evaluation: 11/16/2020      [x] I have reviewed the pain management progress note found in Epic by Dr. Nicole Suresh from 10/20/2020 which meets the criteria for an Interval History and Physical note. [x] I have examined  Pantera Carter a 47 y.o., male who is scheduled for a left L4 and L5 epidural steroid injections by Dr. Nicole Suresh due to lumbar disc herniation, chronic bilateral low back pain with left sided sciatica. The patient denies health changes since his appointment with Dr. Nicole Suresh on 10/20/2020. Pt denies fever, chills, productive cough, SOB, chest pain, open sores, rashes, and wounds. Pt denies history of diabetes. Motrin was last taken on 11/02/2020. Vital signs: BP (!) 155/93   Pulse 54   Temp 97.7 °F (36.5 °C) (Oral)   Resp 16   Ht 6' (1.829 m)   Wt 257 lb (116.6 kg)   SpO2 98%   BMI 34.86 kg/m²      Allergies:  Patient has no known allergies. Past medical history, surgical history, social history, and family history were reviewed and updated in EPIC as indicated. Medications:    Prior to Admission medications    Medication Sig Start Date End Date Taking? Authorizing Provider   ibuprofen (ADVIL;MOTRIN) 200 MG tablet Take 200 mg by mouth every 6 hours as needed for Pain   Yes Historical Provider, MD   acetaminophen (TYLENOL) 500 MG tablet Take 500 mg by mouth every 6 hours as needed for Pain   Yes Historical Provider, MD   Elastic Bandages & Supports (LUMBAR BACK BRACE/SUPPORT PAD) MISC 1 Units by Does not apply route daily PNEUMATIC OFFLOADING BACK BRACE 10/20/20 10/20/21  Margorie Crigler, MD   Menthol, Topical Analgesic, (BIOFREEZE EX) Apply topically    Historical Provider, MD       This is a 47 y.o. male who is pleasant, cooperative, alert and oriented x 3, in no acute distress. Obese. Heart: Asymptomatic, bradycardia. HR 54 BPM. Regular rhythm without murmur, gallop, or rub.    Lungs: Normal respiratory effort, unlabored, and clear to auscultation without wheezes or rales bilaterally. Abdomen: Soft, nontender, nondistended with active bowel sounds. Pedal pulses: 2+ bilaterally. Labs:  No results for input(s): HGB, HCT, WBC, MCV, PLT, NA, K, CL, CO2, BUN, CREATININE, GLUCOSE, INR, PROTIME, APTT, AST, ALT, LABALBU, HCG in the last 720 hours. No results for input(s): COVID19 in the last 720 hours. Yane MELGAR, FNP-BC  Electronically signed 11/16/2020 at 10:21 AM      Salvador Katz MD    Physician    Specialty:  Pain Management    Progress Notes      Signed    Encounter Date:  10/20/2020          Related encounter: Office Visit from 10/20/2020 in [facility]          Signed        Expand All Collapse All     Show:Clear all  [x]Manual[x]Template[x]Copied    Added by:  [x]Ben Short MD    []Radha for details   The patient is a 47 y. o. Non-/non  male.      Chief Complaint   Patient presents with    Back Pain    Consultation         HPI     Requesting physician for the evaluation of Mary Barrios 1980:  Lonnell Kussmaul, APRN - CNP      Pain History  -42-year-old man with history of chronic lower back pain  Onset of symptoms 15 years ago  Report intermittent flareup of his back pain  This recent flareup started nearly 6 months ago  Pain located in the lumbar area across midline left more than right  Radiation of pain down left leg all the way to the foot  Associated symptoms include left leg numbness tingling and weakness  Denies any changes in bladder or bowel control  No history of fever chills or weight loss  Describes the pain as constant aching throbbing sharp shooting sensation  Pain aggravated with lifting bending twisting turning changing position walking  Alleviating factors include rest lying down  Patient recall paraspinal injection series of 3 epidural 5 years ago which were very helpful in a [facility]  No previous lumbar spine surgical history  Has done multiple courses of physical therapy  Recently completed therapy without any improvement  Have tried chiropractic treatment  Was recently evaluated by the neurosurgeon at [place] and is advised for epidural injection  Have a recent MRI lumbar spine, report is available in [facility] Everywhere  Pain score today  3  1. Location:Lumbar  2. Radiation:left leg  3. Character:penetrating, nagging, numb, aching, throbbing,shooting,sharp, burning  5. Duration:constant, several times daily  6. Onset: 2006, worsened 4 months ago  7. Did an injury cause pain: no  8. Aggravating factors:sitting, standing, position,bending, walking  9. Alleviating factors:rest, reposition, lying down  10. Associated symptoms (numbness / tingling / weakness):  yes  -Where at:Left leg  -Down into finger tips or toes (specify which finger or toes):no  -constant or intermitting: intermittent  11. Red Flags: (weight loss / chills / loss of bladder or bowel control):none     Previous management history  1. Previous diagnostic workup: (Imaging/EMG)   CT, MRI, or Xray: MRI  What part of the body:Lumbar  What facility did they have it at:[facility]  What year or specific date: 2020  EMG:  no     2. Previous non interventional treatments tried:  chiropractor or physical therapy: Chiropractor & PT  What part of the body:Lumbar  What [facility] [place] [place] [facility]  How long ago was it last tried:weeks  Did it work: No  Did they complete it:Yes     3. Previous Medications tried  NSAID's: yes  Neurontin: yes, \"didn't like the side effects\"  Lyrica: no  Trycyclic antidepressant (Ellavil / Pamelor ): no  Cymbalta: no  Opioids (Ultram / Vicodin / Percocet / Morphine / Dilaudid / Oramorph/ Fentanyl etc.):Percocet, vicodin  Last Pain medication taken (name of med and date): Ibuprofen 200 mg and tylenol last taken 10/19/20     4.  Previous Interventional pain procedures tried:  What kind of injection:Lumbar GRACIE x 3  Who did the injection: at the [place]  did the injection help: yes  Last time injection was done:2016.  Previous surgeries for pain  What part of the body did they have the surgery:n/a  What physician did the surgery:n/a  What Facility did they have the surgery done:n/a  Date of Surgery:N/A     Social History:  Marital status:   Employment History:, Teacher  Working  Yes  Full time Or Part time: Full time  Disability  No   Legal Issues related to pain complaint: No      Pain Disability Index score : 20           Informant: patient           Past Medical History        Past Medical History:   Diagnosis Date    Annular tear of lumbar disc 2016    Lumbar degenerative disc disease 2016    Protrusion of lumbar intervertebral disc 2016         Past Surgical History         Past Surgical History:   Procedure Laterality Date    VASECTOMY        WISDOM TOOTH EXTRACTION            Social History   Social History            Socioeconomic History    Marital status:        Spouse name: None    Number of children: None    Years of education: None    Highest education level: None   Occupational History    None   Social Needs    Financial resource strain: None    Food insecurity       Worry: None       Inability: None    Transportation needs       Medical: None       Non-medical: None   Tobacco Use    Smoking status: Former Smoker       Packs/day: 0.25       Years: 3.00       Pack years: 0.75       Types: Cigarettes       Last attempt to quit: 2007       Years since quittin.8    Smokeless tobacco: Former User       Types: Chew   Substance and Sexual Activity    Alcohol use: None    Drug use: None    Sexual activity: None   Lifestyle    Physical activity       Days per week: None       Minutes per session: None    Stress: None   Relationships    Social connections       Talks on phone: None Gets together: None       Attends [employer] service: None       Active member of club or organization: None       Attends meetings of clubs or organizations: None       Relationship status: None    Intimate partner violence       Fear of current or ex partner: None       Emotionally abused: None       Physically abused: None       Forced sexual activity: None   Other Topics Concern    None   Social History Narrative    None        Family History         Family History   Problem Relation Age of Onset    High Blood Pressure Father      Heart Attack Paternal Grandfather          No Known Allergies  Patient has no known allergies. Vitals:     10/20/20 1231   BP: (!) 144/94   Pulse: 57   Temp: 97.4 °F (36.3 °C)   SpO2: 100%      Current Facility-Administered Medications          Current Outpatient Medications   Medication Sig Dispense Refill    Elastic Bandages & Supports (LUMBAR BACK BRACE/SUPPORT PAD) MISC 1 Units by Does not apply route daily PNEUMATIC OFFLOADING BACK BRACE 30 each 0    ibuprofen (ADVIL;MOTRIN) 200 MG tablet Take 200 mg by mouth every 6 hours as needed for Pain        acetaminophen (TYLENOL) 500 MG tablet Take 500 mg by mouth every 6 hours as needed for Pain        Menthol, Topical Analgesic, (BIOFREEZE EX) Apply topically          No current facility-administered medications for this visit. Review of Systems   Constitutional: Negative. Negative for fever. HENT: Negative. Eyes: Negative. Respiratory: Negative. Cardiovascular: Negative. Gastrointestinal: Negative. Endocrine: Negative. Genitourinary: Negative. Musculoskeletal: Positive for arthralgias, back pain, neck pain and neck stiffness. Skin: Negative. Allergic/Immunologic: Negative. Neurological: Positive for weakness, numbness and headaches. Psychiatric/Behavioral: The patient is nervous/anxious. All other systems reviewed and are negative.       Objective:  General Appearance: Well-appearing, in no acute distress, uncomfortable and in pain. Vital signs: (most recent): Blood pressure (!) 144/94, pulse 57, temperature 97.4 °F (36.3 °C), temperature source Infrared, height 6' (1.829 m), weight 257 lb (116.6 kg), SpO2 100 %. Vital signs are normal.  No fever. Output: Producing urine and producing stool. HEENT: Normal HEENT exam.    Lungs:  Normal effort and normal respiratory rate. Breath sounds clear to auscultation. He is not in respiratory distress. No decreased breath sounds. Heart: Normal rate. Regular rhythm. Abdomen: Abdomen is soft. There is no abdominal tenderness. Extremities: Normal range of motion. There is no deformity. Neurological: Patient is alert and oriented to person, place and time. Normal strength. Patient has normal reflexes, normal muscle tone and normal coordination. Pupils:  Pupils are equal, round, and reactive to light. Pupils are equal.   Skin:  Warm and dry. No rash or cyanosis.    Spine examination  No apparent deformity on inspection  Range of motion is within normal limits  Pain aggravation with forward flexion and left lateral bend bending  Straight leg raise positive on left side  Gait is stable     Neurological examination  Normal muscle mass, normal muscle tone, muscle strength 5/5 in both lower extremities in all major muscle group  Deep tendon reflexes 2+ and symmetric  Clonus negative     Assessment & Plan   Pain History  -8year-old man with history of chronic lower back pain  Onset of symptoms 15 years ago  Report intermittent flareup of his back pain  This recent flareup started nearly 6 months ago  Pain located in the lumbar area across midline left more than right  Radiation of pain down left leg all the way to the foot  Associated symptoms include left leg numbness tingling and weakness  Denies any changes in bladder or bowel control  No history of fever chills or weight loss  Describes the pain as constant aching throbbing sharp shooting sensation  Pain aggravated with lifting bending twisting turning changing position walking  Alleviating factors include rest lying down  Patient recall paraspinal injection series of 3 epidural 5 years ago which were very helpful in a [facility]  No previous lumbar spine surgical history  Has done multiple courses of physical therapy  Recently completed therapy without any improvement  Have tried chiropractic treatment  Was recently evaluated by the neurosurgeon at [place] and is advised for epidural injection  [facility] lumbar [facility]  Pain score today  3     Physical Therapy Daily Treatment Note     Date:  2020  Patient Name:  Tulio Patrick                                 :  1980                       MRN: [id number]  Physician: [address]: [employer] 30 per yr  Medical Diagnosis:   Left lumbar radiculopathy       Rehab Codes: M54.16  Onset Date: 20 script                                 Next 's appt.:      Visit# / total visits: 10/12                                Cancels/No Shows:      EXAMINATION: XRAY VIEWS OF THE LUMBAR SPINE  2020 3:39 pm    FINDINGS: Lumbar vertebral body height and alignment is maintained. No instability with flexion or extension. Mild disc space narrowing and endplate degenerative changes at L4-L5 and L5-S1. Mild facet degenerative changes within the lower lumbar spine. EXAMINATION: MRI OF THE LUMBAR SPINE WITHOUT CONTRAST, 2020 9:05 am    L3-L4: There is mild disc bulge with annular fissure and mild bilateral posterior facet degenerative change. This causes slight impression on the ventral portion of the thecal sac without significant central canal stenosis or neural foraminal encroachment. L4-L5: Moderate bilateral posterior facet degenerative change.   There is a central and left posterolateral disc protrusion causing severe left subarticular recess stenosis. This could compress the left L5 nerve root. There is slight inferior extension of disc fragment below the L4-5 level. No significant neural foraminal narrowing or central canal stenosis. L5-S1: There is a disc bulge with small left paramedian disc protrusion. This is of uncertain significance and does not appear to cause any compression of the nerve roots and there is only minimal impression on the thecal sac. No significant central canal stenosis seen. There is no evidence of neural foraminal encroachment. Mild bilateral posterior facet degenerative change. 1. Lumbar disc herniation    2.  Chronic bilateral low back pain with left-sided sciatica    MRI lumbar spine  Report reviewed  Images reviewed independently  Images were shown to the patient  Pertinent finding discussed with patient  Most significant finding that concordant with his symptom is left-sided L4-L5 disc herniation with impingement and of the exiting left L4 nerve root and displacement of the traversing nerve root     He has failed conservative measures  Pain is severe and interferes with quality of life  I will recommend for lumbar epidural steroid injection  Also order offloading lumbar back brace         Orders Placed This Encounter   Procedures    MO INJECT ANES/STEROID FORAMEN LUMBAR/SACRAL W IMG GUIDE ,1 LEVEL      Encounter Medications         Orders Placed This Encounter   Medications    Elastic Bandages & Supports (LUMBAR BACK BRACE/SUPPORT PAD) MISC       Si Units by Does not apply route daily PNEUMATIC OFFLOADING BACK BRACE       Dispense:  30 each       Refill:  0         CONSULTATION note sent to the referring physician     Electronically signed by Clayton Simmons MD on 10/20/2020 at 1:27 PM               Revision History

## 2020-12-07 ENCOUNTER — TELEMEDICINE (OUTPATIENT)
Dept: NEUROSURGERY | Age: 40
End: 2020-12-07
Payer: MEDICARE

## 2020-12-07 PROCEDURE — 99212 OFFICE O/P EST SF 10 MIN: CPT | Performed by: NEUROLOGICAL SURGERY

## 2020-12-07 NOTE — PROGRESS NOTES
2020    TELEHEALTH EVALUATION -- Audio/Visual (During HOSPB-20 public health emergency)    HPI:    Lnyn Lennon (:  1980) has requested an audio/video evaluation for the following concern(s):    Lumbar disc herniation    He was seen by Melissa Preciado most recently for his left L4-5 patient is herniation. He has had an epidural injection  which resulted in 1 week and a half of total relief. After that he has occasional pain that radiates down his calf. He reports in general very happy with his improvement thus far. He still working full-time. Review of Systems    Prior to Visit Medications    Medication Sig Taking?  Authorizing Provider   Elastic Bandages & Supports (LUMBAR BACK BRACE/SUPPORT PAD) MISC 1 Units by Does not apply route daily PNEUMATIC OFFLOADING BACK BRACE Yes Clayton Simmons MD   ibuprofen (ADVIL;MOTRIN) 200 MG tablet Take 200 mg by mouth every 6 hours as needed for Pain Yes Historical Provider, MD   acetaminophen (TYLENOL) 500 MG tablet Take 500 mg by mouth every 6 hours as needed for Pain Yes Historical Provider, MD   Menthol, Topical Analgesic, (BIOFREEZE EX) Apply topically Yes Historical Provider, MD       Social History     Tobacco Use    Smoking status: Former Smoker     Packs/day: 0.25     Years: 3.00     Pack years: 0.75     Types: Cigarettes     Last attempt to quit: 2007     Years since quittin.9    Smokeless tobacco: Former User     Types: Chew   Substance Use Topics    Alcohol use: Yes     Comment: occ    Drug use: Not Currently        No Known Allergies,   Past Medical History:   Diagnosis Date    Annular tear of lumbar disc 2016    Lumbar degenerative disc disease 2016    Protrusion of lumbar intervertebral disc 2016    Slow heart rate    ,   Past Surgical History:   Procedure Laterality Date    PAIN MANAGEMENT PROCEDURE Left 2020    LEFT L4 AND L5 EPIDURAL STEROID INJECTION performed by Clayton Simmons MD at [address] [place] [address] VASECTOMY      WISDOM TOOTH EXTRACTION     ,   Social History     Tobacco Use    Smoking status: Former Smoker     Packs/day: 0.25     Years: 3.00     Pack years: 0.75     Types: Cigarettes     Last attempt to quit: 2007     Years since quittin.9    Smokeless tobacco: Former User     Types: Chew   Substance Use Topics    Alcohol use: Yes     Comment: occ    Drug use: Not Currently   ,   Family History   Problem Relation Age of Onset    High Blood Pressure Father     Heart Attack Paternal Grandfather    ,   Immunization History   Administered Date(s) Administered    Tdap (Boostrix, Adacel) 2020   ,   Health Maintenance   Topic Date Due    Varicella vaccine (1 of 2 - 2-dose childhood series) 1983    HIV screen  1997    Flu vaccine (1) 2020    DTaP/Tdap/Td vaccine (2 - Td) 2030    Hepatitis A vaccine  Aged Out    Hepatitis B vaccine  Aged Out    Hib vaccine  Aged Out    Meningococcal (ACWY) vaccine  Aged Out    Pneumococcal 0-64 years Vaccine  Aged Out       PHYSICAL EXAMINATION:  [ INSTRUCTIONS:  \"[x]\" Indicates a positive item  \"[]\" Indicates a negative item  -- DELETE ALL ITEMS NOT EXAMINED]  Vital Signs: (As obtained by patient/caregiver or practitioner observation)    Blood pressure-  Heart rate-    Respiratory rate-    Temperature-  Pulse oximetry-     Constitutional: [x] Appears well-developed and well-nourished [] No apparent distress      [] Abnormal-   Mental status  [] Alert and awake  [] Oriented to person/place/time []Able to follow commands      Eyes:  EOM    []  Normal  [] Abnormal-  Sclera  []  Normal  [] Abnormal -         Discharge []  None visible  [] Abnormal -    HENT:   [x] Normocephalic, atraumatic.   [] Abnormal   [] Mouth/Throat: Mucous membranes are moist.     External Ears [] Normal  [] Abnormal-     Neck: [] No visualized mass     Pulmonary/Chest: [] Respiratory effort normal.  [] No visualized signs of difficulty breathing or respiratory distress        [] Abnormal-      Musculoskeletal:   [] Normal gait with no signs of ataxia         [] Normal range of motion of neck        [] Abnormal-       Neurological:        [x] No Facial Asymmetry (Cranial nerve 7 motor function) (limited exam to video visit)          [] No gaze palsy        [] Abnormal-         Skin:        [] No significant exanthematous lesions or discoloration noted on facial skin         [x] Abnormal-            Psychiatric:       [x] Normal Affect [] No Hallucinations        [] Abnormal-     Other pertinent observable physical exam findings-     ASSESSMENT/PLAN:  1. Chronic bilateral low back pain with left-sided sciatica  Status post epidural steroid injection with significant provement of symptoms    I educated him on his diagnosis and likely good prognosis  I also educated him proper movement and avoiding prolong sitting/standing  At this point he does not need routine follow-up with neurosurgery unless he were to have worsening symptoms    Rylan Horn is a 47 y.o. male being evaluated by a Virtual Visit (video visit) encounter to address concerns as mentioned above. A caregiver was present when appropriate. Due to this being a TeleHealth encounter (During CHHOW-43 public health emergency), evaluation of the following organ systems was limited: Vitals/Constitutional/EENT/Resp/CV/GI//MS/Neuro/Skin/Heme-Lymph-Imm. Pursuant to the emergency declaration under the [address] [place] [address] authority and the [employer] Act, this Virtual Visit was conducted with patient's (and/or legal guardian's) consent, to reduce the patient's risk of exposure to COVID-19 and provide necessary medical care.   The patient (and/or legal guardian) has also been advised to contact this office for worsening conditions or problems, and seek emergency medical treatment and/or call 911 if deemed necessary. Patient identification was verified at the start of the visit: Yes    Total time spent on this encounter: 15    Services were provided through a video synchronous discussion virtually to substitute for in-person clinic visit. Patient and provider were located at their individual homes. --Lam Delaney DO on 12/7/2020 at 12:21 PM    An electronic signature was used to authenticate this note.

## 2021-01-08 DIAGNOSIS — Z84.89 FAMILY HISTORY OF GENETIC DISEASE: Primary | ICD-10-CM

## 2021-02-05 ENCOUNTER — HOSPITAL ENCOUNTER (OUTPATIENT)
Age: 41
Setting detail: SPECIMEN
Discharge: HOME | End: 2021-02-05
Payer: MEDICARE

## 2021-02-05 DIAGNOSIS — Z84.89 FAMILY HISTORY OF GENETIC DISEASE: ICD-10-CM

## 2021-02-05 PROCEDURE — 36415 COLL VENOUS BLD VENIPUNCTURE: CPT

## 2021-02-08 LAB
SEND OUT REPORT: NORMAL
TEST NAME: NORMAL

## 2021-03-03 ENCOUNTER — OFFICE VISIT (OUTPATIENT)
Dept: FAMILY MEDICINE CLINIC | Age: 41
End: 2021-03-03
Payer: MEDICARE

## 2021-03-03 VITALS
OXYGEN SATURATION: 99 % | DIASTOLIC BLOOD PRESSURE: 90 MMHG | WEIGHT: 273 LBS | BODY MASS INDEX: 36.98 KG/M2 | TEMPERATURE: 97.8 F | HEIGHT: 72 IN | RESPIRATION RATE: 18 BRPM | HEART RATE: 75 BPM | SYSTOLIC BLOOD PRESSURE: 138 MMHG

## 2021-03-03 DIAGNOSIS — M51.36 LUMBAR DEGENERATIVE DISC DISEASE: ICD-10-CM

## 2021-03-03 DIAGNOSIS — M51.26 PROTRUSION OF LUMBAR INTERVERTEBRAL DISC: Primary | ICD-10-CM

## 2021-03-03 DIAGNOSIS — M62.830 BACK SPASM: ICD-10-CM

## 2021-03-03 DIAGNOSIS — R03.0 WHITE COAT SYNDROME WITHOUT DIAGNOSIS OF HYPERTENSION: ICD-10-CM

## 2021-03-03 PROCEDURE — 1036F TOBACCO NON-USER: CPT | Performed by: STUDENT IN AN ORGANIZED HEALTH CARE EDUCATION/TRAINING PROGRAM

## 2021-03-03 PROCEDURE — 99214 OFFICE O/P EST MOD 30 MIN: CPT | Performed by: STUDENT IN AN ORGANIZED HEALTH CARE EDUCATION/TRAINING PROGRAM

## 2021-03-03 PROCEDURE — G8484 FLU IMMUNIZE NO ADMIN: HCPCS | Performed by: STUDENT IN AN ORGANIZED HEALTH CARE EDUCATION/TRAINING PROGRAM

## 2021-03-03 PROCEDURE — G8417 CALC BMI ABV UP PARAM F/U: HCPCS | Performed by: STUDENT IN AN ORGANIZED HEALTH CARE EDUCATION/TRAINING PROGRAM

## 2021-03-03 PROCEDURE — G8427 DOCREV CUR MEDS BY ELIG CLIN: HCPCS | Performed by: STUDENT IN AN ORGANIZED HEALTH CARE EDUCATION/TRAINING PROGRAM

## 2021-03-03 RX ORDER — NAPROXEN 500 MG/1
500 TABLET ORAL 2 TIMES DAILY WITH MEALS
Qty: 180 TABLET | Refills: 1 | Status: SHIPPED | OUTPATIENT
Start: 2021-03-03

## 2021-03-03 RX ORDER — METHOCARBAMOL 500 MG/1
500 TABLET, FILM COATED ORAL 4 TIMES DAILY
Qty: 40 TABLET | Refills: 0 | Status: SHIPPED | OUTPATIENT
Start: 2021-03-03 | End: 2021-03-13

## 2021-03-03 RX ORDER — METHYLPREDNISOLONE 4 MG/1
TABLET ORAL
Qty: 1 KIT | Refills: 2 | Status: SHIPPED | OUTPATIENT
Start: 2021-03-03 | End: 2021-03-09

## 2021-03-03 RX ORDER — METHYLPREDNISOLONE SODIUM SUCCINATE 125 MG/2ML
125 INJECTION, POWDER, LYOPHILIZED, FOR SOLUTION INTRAMUSCULAR; INTRAVENOUS ONCE
Status: SHIPPED | OUTPATIENT
Start: 2021-03-03

## 2021-03-03 RX ORDER — METHYLPREDNISOLONE ACETATE 80 MG/ML
80 INJECTION, SUSPENSION INTRA-ARTICULAR; INTRALESIONAL; INTRAMUSCULAR; SOFT TISSUE ONCE
Status: COMPLETED | OUTPATIENT
Start: 2021-03-03 | End: 2021-03-03

## 2021-03-03 RX ORDER — KETOROLAC TROMETHAMINE 30 MG/ML
30 INJECTION, SOLUTION INTRAMUSCULAR; INTRAVENOUS ONCE
Status: COMPLETED | OUTPATIENT
Start: 2021-03-03 | End: 2021-03-03

## 2021-03-03 RX ADMIN — KETOROLAC TROMETHAMINE 30 MG: 30 INJECTION, SOLUTION INTRAMUSCULAR; INTRAVENOUS at 15:42

## 2021-03-03 RX ADMIN — METHYLPREDNISOLONE ACETATE 80 MG: 80 INJECTION, SUSPENSION INTRA-ARTICULAR; INTRALESIONAL; INTRAMUSCULAR; SOFT TISSUE at 15:43

## 2021-03-03 ASSESSMENT — PATIENT HEALTH QUESTIONNAIRE - PHQ9
2. FEELING DOWN, DEPRESSED OR HOPELESS: 0
SUM OF ALL RESPONSES TO PHQ QUESTIONS 1-9: 0

## 2021-03-03 NOTE — PROGRESS NOTES
Brooke Pyror (:  1980) is a 47 y.o. male,Established patient, here for evaluation of the following chief complaint(s):  Pain (back)      ASSESSMENT/PLAN:  1. Protrusion of lumbar intervertebral disc  -     methocarbamol (ROBAXIN) 500 MG tablet; Take 1 tablet by mouth 4 times daily for 10 days, Disp-40 tablet, R-0Normal  -     methylPREDNISolone (MEDROL DOSEPACK) 4 MG tablet; Take by mouth., Disp-1 kit, R-2Normal  -     naproxen (NAPROSYN) 500 MG tablet; Take 1 tablet by mouth 2 times daily (with meals), Disp-180 tablet, R-1Normal  -     ketorolac (TORADOL) injection 30 mg; 30 mg, Intravenous, ONCE, Wed 3/3/21 at 1545, For 1 doseDo not administer for more than 5 days  -     methylPREDNISolone sodium (SOLU-MEDROL) injection 125 mg; 125 mg, Intravenous, ONCE, Wed 3/3/21 at 1545, For 1 dose  -     methylPREDNISolone acetate (DEPO-MEDROL) injection 80 mg; 80 mg, Intramuscular, ONCE, Wed 3/3/21 at 1600, For 1 dose  2. Lumbar degenerative disc disease  -     methocarbamol (ROBAXIN) 500 MG tablet; Take 1 tablet by mouth 4 times daily for 10 days, Disp-40 tablet, R-0Normal  -     methylPREDNISolone (MEDROL DOSEPACK) 4 MG tablet; Take by mouth., Disp-1 kit, R-2Normal  -     naproxen (NAPROSYN) 500 MG tablet; Take 1 tablet by mouth 2 times daily (with meals), Disp-180 tablet, R-1Normal  -     ketorolac (TORADOL) injection 30 mg; 30 mg, Intravenous, ONCE, Wed 3/3/21 at 1545, For 1 doseDo not administer for more than 5 days  -     methylPREDNISolone sodium (SOLU-MEDROL) injection 125 mg; 125 mg, Intravenous, ONCE, Wed 3/3/21 at 1545, For 1 dose  -     methylPREDNISolone acetate (DEPO-MEDROL) injection 80 mg; 80 mg, Intramuscular, ONCE, Wed 3/3/21 at 1600, For 1 dose  3.  Back spasm  -     ketorolac (TORADOL) injection 30 mg; 30 mg, Intravenous, ONCE, Wed 3/3/21 at 1545, For 1 doseDo not administer for more than 5 days  -     methylPREDNISolone sodium (SOLU-MEDROL) injection 125 mg; 125 mg, Intravenous, ONCE, Wed 3/3/21 at 1545, For 1 dose  -     methylPREDNISolone acetate (DEPO-MEDROL) injection 80 mg; 80 mg, Intramuscular, ONCE, Wed 3/3/21 at 1600, For 1 dose  4. White coat syndrome without diagnosis of hypertension    Experiencing acute flareup on the background of DJD bulging disks on 3 levels  Last MRI performed 2020  No plans for neurosurgery at this time but does have relationship should he require letter  Orders as per above  Thankfully patient is not on chronic opioids    No follow-ups on file. SUBJECTIVE/OBJECTIVE:  HPI: 72-year-old male with history of DJD bulging disks diagnosis 1 May 2007  No alarm signs or symptoms  Is experiencing exacerbation of his underlying chronic pathology    Is in obvious pain examination not performed    Review of Systems 12 point ROS per HPI notable for back pain, no bowel bladder incontinence, anal sensation preserved    Physical Exam not performed      On this date 3/3/2021 I have spent 30 minutes reviewing previous notes, test results and face to face with the patient discussing the diagnosis and importance of compliance with the treatment plan as well as documenting on the day of the visit. An electronic signature was used to authenticate this note.     --Andrew Mendez MD

## 2022-11-08 ENCOUNTER — OFFICE VISIT (OUTPATIENT)
Dept: PRIMARY CARE CLINIC | Age: 42
End: 2022-11-08
Payer: MEDICARE

## 2022-11-08 VITALS
BODY MASS INDEX: 37.03 KG/M2 | DIASTOLIC BLOOD PRESSURE: 82 MMHG | OXYGEN SATURATION: 97 % | HEART RATE: 50 BPM | TEMPERATURE: 98 F | WEIGHT: 273 LBS | SYSTOLIC BLOOD PRESSURE: 134 MMHG

## 2022-11-08 DIAGNOSIS — H00.015 HORDEOLUM EXTERNUM OF LEFT LOWER EYELID: Primary | ICD-10-CM

## 2022-11-08 PROCEDURE — G8417 CALC BMI ABV UP PARAM F/U: HCPCS | Performed by: PHYSICIAN ASSISTANT

## 2022-11-08 PROCEDURE — G8484 FLU IMMUNIZE NO ADMIN: HCPCS | Performed by: PHYSICIAN ASSISTANT

## 2022-11-08 PROCEDURE — 99213 OFFICE O/P EST LOW 20 MIN: CPT | Performed by: PHYSICIAN ASSISTANT

## 2022-11-08 PROCEDURE — 1036F TOBACCO NON-USER: CPT | Performed by: PHYSICIAN ASSISTANT

## 2022-11-08 PROCEDURE — G8427 DOCREV CUR MEDS BY ELIG CLIN: HCPCS | Performed by: PHYSICIAN ASSISTANT

## 2022-11-08 RX ORDER — ERYTHROMYCIN 5 MG/G
OINTMENT OPHTHALMIC
Qty: 1 EACH | Refills: 0 | Status: SHIPPED | OUTPATIENT
Start: 2022-11-08 | End: 2022-11-18

## 2022-11-08 ASSESSMENT — ENCOUNTER SYMPTOMS
RESPIRATORY NEGATIVE: 1
EYE PAIN: 1
GASTROINTESTINAL NEGATIVE: 1
EYE REDNESS: 1

## 2022-11-08 NOTE — PROGRESS NOTES
[place]  [address] [address]  Phone: [phone]  Fax: Aries Irwin    Pt Name: Nadia Easton  MRN: [id number]  [place] 1980  Date of evaluation: 11/8/2022  Provider: Mavis Rea PA-C     CHIEF COMPLAINT       Chief Complaint   Patient presents with    Stye     Left eye. States that he had one last week, but he popped it and it went away. It came back and he is doing at home treatments with ointment and it isnt helping. HISTORY OF PRESENT ILLNESS  (Location/Symptom, Timing/Onset, Context/Setting, Quality, Duration, Modifying Factors, Severity.)   Nadia Easton is a 38 y.o. White (non-) [1] male who presents to the office for evaluation of      Eye Pain   The left (left lower eye lid stye) eye is affected. This is a new problem. The current episode started in the past 7 days. The problem has been waxing and waning. There was no injury mechanism. The pain is mild. Associated symptoms include eye redness. Pertinent negatives include no fever. Nursing Notes were reviewed. REVIEW OF SYSTEMS    (2-9 systems for level 4, 10 or more for level 5)     Review of Systems   Constitutional:  Negative for chills, diaphoresis, fatigue and fever. HENT: Negative. Eyes:  Positive for pain and redness. Respiratory: Negative. Cardiovascular: Negative. Gastrointestinal: Negative. Genitourinary: Negative. Musculoskeletal: Negative. Except as noted above the remainder of the review of systems was reviewed andnegative. PAST MEDICAL HISTORY   History reviewed. Past Medical History:   Diagnosis Date    Annular tear of lumbar disc 4/25/2016    Lumbar degenerative disc disease 4/25/2016    Protrusion of lumbar intervertebral disc 4/25/2016    Slow heart rate          SURGICAL HISTORY     History reviewed.     Past Surgical History:   Procedure Laterality Date    PAIN MANAGEMENT PROCEDURE Left 11/16/2020 LEFT L4 AND L5 EPIDURAL STEROID INJECTION performed by Cruz Rivers MD at [address]       Current Outpatient Medications   Medication Sig Dispense Refill    erythromycin (ROMYCIN) 5 MG/GM ophthalmic ointment Apply small amount to lid line nightly 1 each 0     Current Facility-Administered Medications   Medication Dose Route Frequency Provider Last Rate Last Admin    methylPREDNISolone sodium (SOLU-MEDROL) injection 125 mg  125 mg IntraVENous Once Tony Culp MD             ALLERGIES     Patient has no known allergies. FAMILY HISTORY           Problem Relation Age of Onset    High Blood Pressure Father     Heart Attack Paternal Grandfather      Family Status   Relation Name Status    Mother  Alive    Father  Alive    Brother  Alive    MGM  Alive    MGF  Alive    [facility]  Alive    PGF            SOCIAL HISTORY      reports that he quit smoking about 14 years ago. His smoking use included cigarettes. He has a 0.75 pack-year smoking history. He has quit using smokeless tobacco.  His smokeless tobacco use included chew. He reports current alcohol use. He reports that he does not currently use drugs. PHYSICAL EXAM    (up to 7 for level 4, 8 or more for level 5)     Vitals:    22 0819   BP: 134/82   Site: Left Upper Arm   Position: Sitting   Cuff Size: Large Adult   Pulse: 50   Temp: 98 °F (36.7 °C)   SpO2: 97%   Weight: 273 lb (123.8 kg)         Physical Exam  Vitals and nursing note reviewed. Constitutional:       General: He is not in acute distress. Appearance: Normal appearance. He is not ill-appearing. HENT:      Head: Normocephalic and atraumatic. Right Ear: External ear normal.      Left Ear: External ear normal.      Nose: Nose normal.      Mouth/Throat:      Mouth: Mucous membranes are moist.   Eyes:      General:         Left eye: Hordeolum present. No foreign body or discharge.    Pulmonary:      Effort: Pulmonary effort is normal.   Abdominal:      Palpations: Abdomen is soft. Skin:     General: Skin is warm and dry. Neurological:      Mental Status: He is alert and oriented to person, place, and time. DIFFERENTIAL DIAGNOSIS:       Alba Guadarrama reviewed the disposition diagnosis with the patient and or their family/guardian. I have answered their questions and given discharge instructions. They voiced understanding of these instructions and did not have anyfurther questions or complaints. PROCEDURES:  No orders of the defined types were placed in this encounter. No results found for this visit on 11/08/22. FINALIMPRESSION      Visit Diagnoses and Associated Orders       Hordeolum externum of left lower eyelid    -  Primary         ORDERS WITHOUT AN ASSOCIATED DIAGNOSIS    erythromycin (ROMYCIN) 5 MG/GM ophthalmic ointment [2888]                PLAN     Return if symptoms worsen or fail to improve. DISCHARGEMEDICATIONS:  Orders Placed This Encounter   Medications    erythromycin (ROMYCIN) 5 MG/GM ophthalmic ointment     Sig: Apply small amount to lid line nightly     Dispense:  1 each     Refill:  0         Plan:  Based on the clinical exam findings-- I will treat this as a bacterial conjunctivitis at this time with antibiotic eye gtts. Cool compresses to the eyes if desired. Good hand hygiene encouraged. Patient agreeable to treatment plan. Educational materials provided on AVS.  Follow up if symptoms do not improve/worsen. Patient instructed to return to the office if symptoms worsen, return, or have any other concerns. Patient understands and is agreeable.          Eitan Bazan PA-C 11/8/2022 3:05 PM

## 2024-01-09 ENCOUNTER — HOSPITAL ENCOUNTER (OUTPATIENT)
Age: 44
Setting detail: SPECIMEN
Discharge: HOME | End: 2024-01-09

## 2024-01-09 ENCOUNTER — OFFICE VISIT (OUTPATIENT)
Dept: PRIMARY CARE CLINIC | Age: 44
End: 2024-01-09
Payer: COMMERCIAL

## 2024-01-09 VITALS
SYSTOLIC BLOOD PRESSURE: 124 MMHG | OXYGEN SATURATION: 96 % | HEART RATE: 61 BPM | HEIGHT: 72 IN | WEIGHT: 280 LBS | DIASTOLIC BLOOD PRESSURE: 78 MMHG | BODY MASS INDEX: 37.93 KG/M2 | TEMPERATURE: 98.1 F

## 2024-01-09 DIAGNOSIS — J34.89 SINUS DRAINAGE: ICD-10-CM

## 2024-01-09 DIAGNOSIS — J02.0 STREP PHARYNGITIS: Primary | ICD-10-CM

## 2024-01-09 DIAGNOSIS — J02.9 SORE THROAT: ICD-10-CM

## 2024-01-09 LAB — S PYO AG THROAT QL: POSITIVE

## 2024-01-09 PROCEDURE — G8427 DOCREV CUR MEDS BY ELIG CLIN: HCPCS | Performed by: PHYSICIAN ASSISTANT

## 2024-01-09 PROCEDURE — G8417 CALC BMI ABV UP PARAM F/U: HCPCS | Performed by: PHYSICIAN ASSISTANT

## 2024-01-09 PROCEDURE — 1036F TOBACCO NON-USER: CPT | Performed by: PHYSICIAN ASSISTANT

## 2024-01-09 PROCEDURE — 99213 OFFICE O/P EST LOW 20 MIN: CPT | Performed by: PHYSICIAN ASSISTANT

## 2024-01-09 PROCEDURE — G8484 FLU IMMUNIZE NO ADMIN: HCPCS | Performed by: PHYSICIAN ASSISTANT

## 2024-01-09 PROCEDURE — 87880 STREP A ASSAY W/OPTIC: CPT | Performed by: PHYSICIAN ASSISTANT

## 2024-01-09 RX ORDER — PREDNISONE 20 MG/1
20 TABLET ORAL 2 TIMES DAILY
Qty: 10 TABLET | Refills: 0 | Status: SHIPPED | OUTPATIENT
Start: 2024-01-09 | End: 2024-01-14

## 2024-01-09 RX ORDER — AMOXICILLIN 500 MG/1
500 CAPSULE ORAL 2 TIMES DAILY
Qty: 20 CAPSULE | Refills: 0 | Status: SHIPPED | OUTPATIENT
Start: 2024-01-09 | End: 2024-01-19

## 2024-01-09 ASSESSMENT — ENCOUNTER SYMPTOMS
SINUS PRESSURE: 1
GASTROINTESTINAL NEGATIVE: 1
SWOLLEN GLANDS: 1
RESPIRATORY NEGATIVE: 1
SORE THROAT: 1

## 2024-01-09 NOTE — PROGRESS NOTES
Rate and Rhythm: Normal rate.   Pulmonary:      Effort: Pulmonary effort is normal.   Abdominal:      Palpations: Abdomen is soft.   Skin:     General: Skin is warm and dry.   Neurological:      Mental Status: He is alert and oriented to person, place, and time.           DIFFERENTIAL DIAGNOSIS:       Nkechi reviewed the disposition diagnosis with the patient and or their family/guardian.  I have answered their questions and given discharge instructions.  They voiced understanding of these instructions and did not have anyfurther questions or complaints.      PROCEDURES:  Orders Placed This Encounter   Procedures    COVID-19     Scheduling Instructions:      1) Due to current limited availability of the COVID-19 test, tests will be prioritized based on responses to questions above. Testing may be delayed due to volume.            2) Print and instruct patient to adhere to CDC home isolation program. (Link Above)              3) Set up or refer patient for a monitoring program.              4) Have patient sign up for and leverage MyChart (if not previously done).     Order Specific Question:   Is this test for diagnosis or screening?     Answer:   Diagnosis of ill patient     Order Specific Question:   Symptomatic for COVID-19 as defined by CDC?     Answer:   Yes     Order Specific Question:   Date of Symptom Onset     Answer:   1/8/2024     Order Specific Question:   Hospitalized for COVID-19?     Answer:   No     Order Specific Question:   Admitted to ICU for COVID-19?     Answer:   No     Order Specific Question:   Employed in healthcare setting?     Answer:   No     Order Specific Question:   Resident in a congregate (group) care setting?     Answer:   No     Order Specific Question:   Previously tested for COVID-19?     Answer:   No     Order Specific Question:   Pregnant:     Answer:   No    POCT rapid strep A       No results found for this visit on 01/09/24.    FINALIMPRESSION      Visit Diagnoses and

## 2024-01-10 LAB
SARS-COV-2 RNA RESP QL NAA+PROBE: NORMAL
SARS-COV-2 RNA RESP QL NAA+PROBE: NOT DETECTED
SOURCE: NORMAL

## 2024-02-20 NOTE — CONSULTS
[] [facility]) [facility] &  Therapy  [address]  [phone]  F: [phone] [] [address]  [place] [address]   [address]  P: [phone]  F: [phone] [x] [address] &  Therapy  [address]  P: [phone]  F: [phone] [] [address]  [facility]   [address]   [place]: [phone]  F: [phone]      Physical Therapy Spine Evaluation    Date:  2020  Patient: Jeromy Frost  : 1980  MRN: [id number]  Physician: [address]: KI BRIZUELA 30 per yr  Medical Diagnosis:  Left lumbar radiculopathy  Rehab Codes: M54.16  Onset Date: 20 script  Next 's appt.: prn    Subjective:   CC:  Pt c/o constant, burning pain L post hip and down into L LE to lateral calf. Incr pain w/ prolonged position and w/ change in position. Intermittent N&T L LE. Notes has noticed tripping on L toe occ and leg occ feels weird like a \"dead leg. \" Pt notes he has pain in the morning till up and moving and pain at night w/ incr throbbing pain but sleeping ok. HPI: (onset date) Pt reports in  he had a pinch in LB and cont'd to work out and complete training while in the [employer] eventually discharged from [facility]. Had therapy and epidurals through [place] then started seeing Chiropractor. Has been seeing Chiropractor at least once a month last 3 yrs w/ good relief. Went to [employer] and tried to swing golf club~march and had to see chiro almost daily. Incr pain over the last month despite seeing Chiro, saw Dr and had steroid injection last Fri and started on medrol pack w/ some relief. Notes no xray or MRI.     PMHx: [x] Unremarkable [] Diabetes [] HTN  [] Pacemaker   [] MI/Heart Problems [] Cancer [] Arthritis [] Other:              [x] Refer to full medical chart  In EPIC     Tests: [] 6 (moderate pain) [x] []    Rounded Shoulders [] [x] []    Kyphosis [] [] []    Lordosis [] [x] [] decr   Lateral Shift [] [] []    Scoliosis [] [] []    Iliac Crest [x] [] []    PSIS [x] [] []    ASIS [x] [] []    Genu Valgus [] [] []    Genu Varus [] [] []    Genu Recurvatum [] [] []    Pronation [] [] []    Supination [] [] []    Leg Length Discrp [] [] []    Slumped Sitting [] [x] []    Palpation [] [] [] Sig muscular tension lumbar paraspinals L>R, min L gmed, piriformis. Sig TTP and tightness gastroc/soleus and peroneals   Sensation [] [x] [] Intermit. L LE   Edema [] [] []    Neurological [] [] []        Functional Test: Oswestry Score: 24% functionally impaired     Comments:    Assessment:  Patient would benefit from skilled physical therapy services in order to: address the following deficits    Problems:    [x] ? Pain: LB, L LE  [x] ? ROM: lumbar spine, HS, calf musculature  [x] ? Strength:posture/core stability  [x] ? Function: sit, stand , transitional movements  [] Other:      STG: (to be met in  treatments)  1. ? Pain: 2/10 at worst  2. ? ROM: Lumbar flexion WFL, improve HA/calf extensibility  3. ? Strength: Pt to demo good postural/core stability  4. ? Function: Able to transition from sit to stand w/o difficulty and deepika stationary position for >1HR  5. Patient to be independent with home exercise program as demonstrated by performance with correct form without cues. Patient goals: decr pain, gain mobility    Rehab Potential:  [x] Good  [] Fair  [] Poor   Suggested Professional Referral:  [x] No  [] Yes:  Barriers to Goal Achievement:  [] No  [x] Yes: chronic, long HX  Domestic Concerns:  [x] No  [] Yes:    Pt. Education:  [x] Plans/Goals, Risks/Benefits discussed  [x] Home exercise program  Method of Education: [x] Verbal  [x] Demo  [x] Written  Comprehension of Education:  [x] Verbalizes understanding. [] Demonstrates understanding. [] Needs Review.   [] Demonstrates/verbalizes understanding of HEP/Ed previously [x]  Ther Exercise 20 1   [x]  Manual Therapy 10 1   []  Ther Activities     []  Aquatics     []  Vasocompression     []  Other       TOTAL TREATMENT TIME: 50    Time in: 2:50 pm      Time out: 4:00 pm    Electronically signed by: Michael Paez PT        Physician Signature:________________________________Date:__________________  By signing above or cosigning this note, I have reviewed this plan of care and certify a need for medically necessary rehabilitation services.      *PLEASE SIGN ABOVE AND FAX BACK ALL PAGES*

## 2025-03-01 ENCOUNTER — HOSPITAL ENCOUNTER (EMERGENCY)
Age: 45
Discharge: HOME | End: 2025-03-01
Attending: EMERGENCY MEDICINE
Payer: COMMERCIAL

## 2025-03-01 ENCOUNTER — APPOINTMENT (OUTPATIENT)
Dept: CT IMAGING | Age: 45
End: 2025-03-01
Payer: COMMERCIAL

## 2025-03-01 VITALS
TEMPERATURE: 97.9 F | RESPIRATION RATE: 18 BRPM | BODY MASS INDEX: 37.97 KG/M2 | OXYGEN SATURATION: 100 % | HEART RATE: 53 BPM | DIASTOLIC BLOOD PRESSURE: 101 MMHG | HEIGHT: 72 IN | SYSTOLIC BLOOD PRESSURE: 163 MMHG

## 2025-03-01 DIAGNOSIS — N20.1 URETEROLITHIASIS: Primary | ICD-10-CM

## 2025-03-01 LAB
ANION GAP SERPL CALCULATED.3IONS-SCNC: 13 MMOL/L (ref 9–17)
BACTERIA URNS QL MICRO: ABNORMAL
BASOPHILS # BLD: 0 K/UL (ref 0–0.2)
BASOPHILS NFR BLD: 1 % (ref 0–2)
BILIRUB UR QL STRIP: NEGATIVE
BUN SERPL-MCNC: 11 MG/DL (ref 6–20)
CALCIUM SERPL-MCNC: 9.6 MG/DL (ref 8.6–10.4)
CHARACTER UR: ABNORMAL
CHLORIDE SERPL-SCNC: 103 MMOL/L (ref 98–107)
CLARITY UR: CLEAR
CO2 SERPL-SCNC: 23 MMOL/L (ref 20–31)
COLOR UR: YELLOW
CREAT SERPL-MCNC: 0.9 MG/DL (ref 0.7–1.2)
EOSINOPHIL # BLD: 0.1 K/UL (ref 0–0.4)
EOSINOPHILS RELATIVE PERCENT: 2 % (ref 1–4)
EPI CELLS #/AREA URNS HPF: ABNORMAL /HPF (ref 0–5)
ERYTHROCYTE [DISTWIDTH] IN BLOOD BY AUTOMATED COUNT: 12.9 % (ref 12.5–15.4)
GFR, ESTIMATED: >90 ML/MIN/1.73M2
GLUCOSE SERPL-MCNC: 104 MG/DL (ref 70–99)
GLUCOSE UR STRIP-MCNC: NEGATIVE MG/DL
HCT VFR BLD AUTO: 46.6 % (ref 41–53)
HGB BLD-MCNC: 16.3 G/DL (ref 13.5–17.5)
HGB UR QL STRIP.AUTO: ABNORMAL
KETONES UR STRIP-MCNC: NEGATIVE MG/DL
LEUKOCYTE ESTERASE UR QL STRIP: NEGATIVE
LYMPHOCYTES NFR BLD: 2 K/UL (ref 1–4.8)
LYMPHOCYTES RELATIVE PERCENT: 30 % (ref 24–44)
MCH RBC QN AUTO: 30.4 PG (ref 26–34)
MCHC RBC AUTO-ENTMCNC: 35 G/DL (ref 31–37)
MCV RBC AUTO: 86.9 FL (ref 80–100)
MONOCYTES NFR BLD: 0.4 K/UL (ref 0.1–1.2)
MONOCYTES NFR BLD: 6 % (ref 2–11)
NEUTROPHILS NFR BLD: 61 % (ref 36–66)
NEUTS SEG NFR BLD: 4.1 K/UL (ref 1.8–7.7)
NITRITE UR QL STRIP: NEGATIVE
PH UR STRIP: 6 [PH] (ref 5–8)
PLATELET # BLD AUTO: 189 K/UL (ref 140–450)
PMV BLD AUTO: 8.2 FL (ref 6–12)
POTASSIUM SERPL-SCNC: 4 MMOL/L (ref 3.7–5.3)
PROT UR STRIP-MCNC: NEGATIVE MG/DL
RBC # BLD AUTO: 5.36 M/UL (ref 4.5–5.9)
RBC #/AREA URNS HPF: ABNORMAL /HPF (ref 0–2)
SODIUM SERPL-SCNC: 139 MMOL/L (ref 135–144)
SP GR UR STRIP: 1 (ref 1–1.03)
UROBILINOGEN UR STRIP-ACNC: NORMAL EU/DL (ref 0–1)
WBC #/AREA URNS HPF: ABNORMAL /HPF (ref 0–5)
WBC OTHER # BLD: 6.6 K/UL (ref 3.5–11)

## 2025-03-01 PROCEDURE — 81001 URINALYSIS AUTO W/SCOPE: CPT

## 2025-03-01 PROCEDURE — 80048 BASIC METABOLIC PNL TOTAL CA: CPT

## 2025-03-01 PROCEDURE — 99284 EMERGENCY DEPT VISIT MOD MDM: CPT

## 2025-03-01 PROCEDURE — 74176 CT ABD & PELVIS W/O CONTRAST: CPT

## 2025-03-01 PROCEDURE — 6370000000 HC RX 637 (ALT 250 FOR IP): Performed by: EMERGENCY MEDICINE

## 2025-03-01 PROCEDURE — 85025 COMPLETE CBC W/AUTO DIFF WBC: CPT

## 2025-03-01 RX ORDER — TAMSULOSIN HYDROCHLORIDE 0.4 MG/1
0.4 CAPSULE ORAL ONCE
Status: COMPLETED | OUTPATIENT
Start: 2025-03-01 | End: 2025-03-01

## 2025-03-01 RX ORDER — TAMSULOSIN HYDROCHLORIDE 0.4 MG/1
0.4 CAPSULE ORAL DAILY
Qty: 30 CAPSULE | Refills: 0 | Status: SHIPPED | OUTPATIENT
Start: 2025-03-01

## 2025-03-01 RX ADMIN — TAMSULOSIN HYDROCHLORIDE 0.4 MG: 0.4 CAPSULE ORAL at 16:09

## 2025-03-01 ASSESSMENT — PAIN DESCRIPTION - LOCATION: LOCATION: FLANK

## 2025-03-01 ASSESSMENT — PAIN SCALES - GENERAL: PAINLEVEL_OUTOF10: 5

## 2025-03-01 ASSESSMENT — PAIN - FUNCTIONAL ASSESSMENT: PAIN_FUNCTIONAL_ASSESSMENT: 0-10

## 2025-03-01 ASSESSMENT — PAIN DESCRIPTION - ORIENTATION: ORIENTATION: LEFT

## 2025-03-01 NOTE — DISCHARGE INSTRUCTIONS
Take the Flomax once daily as prescribed.  Okay to take ibuprofen or Tylenol if needed for any additional pain.  Drink plenty of fluids, plan for follow-up with urology.  Call for appointment.  Return here if your pain is worse, you develop fever, uncontrolled vomiting.

## 2025-03-01 NOTE — ED PROVIDER NOTES
[facility] Emergency Department  [address]  [facility] [address]  Phone: [phone]  Fax: [phone]      Pt Name: Alfie Lozada  MRN:[id number]  Birthdate 1980  Date of evaluation: 3/1/2025      CHIEF COMPLAINT       Chief Complaint   Patient presents with    Flank Pain       HISTORY OF PRESENT ILLNESS   44-year-old male here with his wife with complaints of hematuria and left flank pain.  Has not taken anything for this at home.  Both of these started this morning.  Pain is sharp and without radiation.  Basically constant. Remote history of vasectomy about 14 years ago, otherwise has had no history of any urinary tract pathology.  Denies any abdominal pain with this, no nausea or vomiting.  Has had no fever or chills.  No trauma.    REVIEWOF SYSTEMS     Review of Systems      PAST MEDICAL HISTORY    has a past medical history of Annular tear of lumbar disc, Lumbar degenerative disc disease, Protrusion of lumbar intervertebral disc, and Slow heart rate.    SURGICAL HISTORY      has a past surgical history that includes Vasectomy; [place] tooth extraction; and Pain management procedure (Left, 2020).    CURRENTMEDICATIONS       Discharge Medication List as of 3/1/2025  4:04 PM          ALLERGIES     has No Known Allergies.    FAMILY HISTORY     He indicated that his mother is alive. He indicated that his father is alive. He indicated that his brother is alive. He indicated that his maternal grandmother is alive. He indicated that his maternal grandfather is alive. He indicated that his paternal grandmother is alive. He indicated that his paternal grandfather is .     family history includes Heart Attack in his paternal grandfather; High Blood Pressure in his father.    SOCIAL HISTORY      reports that he quit smoking about 17 years ago. His smoking use included cigarettes. He started smoking about 20 years ago. He has a 0.8 pack-year smoking history. He has quit using smokeless  MD  [address]  [address]  [facility] [address]  [phone]    Schedule an appointment as soon as possible for a visit   As needed      DISCHARGE MEDICATIONS:  Discharge Medication List as of 3/1/2025  4:04 PM        START taking these medications    Details   tamsulosin (FLOMAX) 0.4 MG capsule Take 1 capsule by mouth daily, Disp-30 capsule, R-0Normal             (Please note that portions of this note werecompleted with a voice recognition program.  Efforts were made to edit the dictations but occasionally words are mis-transcribed.)    Apolinar Lara MD   Emergency Physician Attending         Apolinar Lara MD  03/02/25 6764

## (undated) DEVICE — GLOVE SURG SZ 75 CRM LTX FREE POLYISOPRENE POLYMER BEAD ANTI

## (undated) DEVICE — 1010 S-DRAPE TOWEL DRAPE 10/BX: Brand: STERI-DRAPE™

## (undated) DEVICE — TOWEL,OR,DSP,ST,BLUE,DLX,XR,4/PK,20PK/CS: Brand: MEDLINE

## (undated) DEVICE — ZINACTIVE USE 2539609 APPLICATOR MEDICATED 10.5 CC SOLUTION HI LT ORNG CHLORAPREP

## (undated) DEVICE — EXTENSION SET IV 12 IN 0.45 CC INFUSION MINIBOR TBNG CLMP

## (undated) DEVICE — NEEDLE SPNL L4.5IN OD22GA QNCKE TYP SPINOCAN

## (undated) DEVICE — SINGLE DOSE EPI TY